# Patient Record
Sex: MALE | NOT HISPANIC OR LATINO | Employment: OTHER | URBAN - METROPOLITAN AREA
[De-identification: names, ages, dates, MRNs, and addresses within clinical notes are randomized per-mention and may not be internally consistent; named-entity substitution may affect disease eponyms.]

---

## 2023-02-17 PROBLEM — M17.12 PRIMARY OSTEOARTHRITIS OF LEFT KNEE: Status: ACTIVE | Noted: 2023-02-17

## 2023-02-17 PROBLEM — H93.11 SUBJECTIVE TINNITUS, RIGHT: Status: ACTIVE | Noted: 2023-02-17

## 2023-02-17 PROBLEM — M54.16 LUMBAR RADICULOPATHY: Status: ACTIVE | Noted: 2023-02-17

## 2023-02-17 PROBLEM — G57.62 MORTON'S NEUROMA OF LEFT FOOT: Status: ACTIVE | Noted: 2023-02-17

## 2023-02-17 PROBLEM — M65.4 DE QUERVAIN'S TENOSYNOVITIS: Status: ACTIVE | Noted: 2023-02-17

## 2023-02-17 PROBLEM — M17.9 OSTEOARTHRITIS OF KNEE: Status: ACTIVE | Noted: 2023-02-17

## 2023-02-17 PROBLEM — E55.9 VITAMIN D DEFICIENCY: Status: ACTIVE | Noted: 2023-02-17

## 2023-02-17 PROBLEM — J30.9 ALLERGIC RHINITIS: Status: ACTIVE | Noted: 2023-02-17

## 2023-02-17 PROBLEM — R42 DIZZINESS: Status: ACTIVE | Noted: 2023-02-17

## 2023-02-17 PROBLEM — N43.3 HYDROCELE, UNSPECIFIED: Status: ACTIVE | Noted: 2023-02-17

## 2023-02-17 PROBLEM — G89.29 CHRONIC PAIN OF LEFT KNEE: Status: ACTIVE | Noted: 2023-02-17

## 2023-02-17 PROBLEM — K59.09 CHRONIC CONSTIPATION: Status: ACTIVE | Noted: 2023-02-17

## 2023-02-17 PROBLEM — I49.1 PAC (PREMATURE ATRIAL CONTRACTION): Status: ACTIVE | Noted: 2023-02-17

## 2023-02-17 PROBLEM — B34.9 VIRAL SYNDROME: Status: ACTIVE | Noted: 2023-02-17

## 2023-02-17 PROBLEM — G58.9 ENTRAPMENT NEUROPATHY: Status: ACTIVE | Noted: 2023-02-17

## 2023-02-17 PROBLEM — H90.3 BILATERAL SENSORINEURAL HEARING LOSS: Status: ACTIVE | Noted: 2023-02-17

## 2023-02-17 PROBLEM — M18.12 LOCALIZED PRIMARY OSTEOARTHRITIS OF CARPOMETACARPAL JOINT OF LEFT THUMB: Status: ACTIVE | Noted: 2023-02-17

## 2023-02-17 PROBLEM — H81.09 MENIERE'S DISEASE: Status: ACTIVE | Noted: 2023-02-17

## 2023-02-17 PROBLEM — M54.59 MECHANICAL LOW BACK PAIN: Status: ACTIVE | Noted: 2023-02-17

## 2023-02-17 PROBLEM — R00.2 PALPITATIONS: Status: ACTIVE | Noted: 2023-02-17

## 2023-02-17 PROBLEM — S69.90XA FINGER INJURY: Status: ACTIVE | Noted: 2023-02-17

## 2023-02-17 PROBLEM — I47.10 PAROXYSMAL SVT (SUPRAVENTRICULAR TACHYCARDIA) (CMS-HCC): Status: ACTIVE | Noted: 2023-02-17

## 2023-02-17 PROBLEM — M77.10 LATERAL EPICONDYLITIS: Status: ACTIVE | Noted: 2023-02-17

## 2023-02-17 PROBLEM — T14.8XXA WOUND DISCHARGE: Status: ACTIVE | Noted: 2023-02-17

## 2023-02-17 PROBLEM — H93.8X1 FULLNESS IN EAR, RIGHT: Status: ACTIVE | Noted: 2023-02-17

## 2023-02-17 PROBLEM — I47.29 NSVT (NONSUSTAINED VENTRICULAR TACHYCARDIA) (MULTI): Status: ACTIVE | Noted: 2023-02-17

## 2023-02-17 PROBLEM — D36.10 SCHWANNOMA: Status: ACTIVE | Noted: 2023-02-17

## 2023-02-17 PROBLEM — M79.643 HAND PAIN: Status: ACTIVE | Noted: 2023-02-17

## 2023-02-17 PROBLEM — I49.3 PVC (PREMATURE VENTRICULAR CONTRACTION): Status: ACTIVE | Noted: 2023-02-17

## 2023-02-17 PROBLEM — M20.21 HALLUX RIGIDUS, RIGHT FOOT: Status: ACTIVE | Noted: 2023-02-17

## 2023-02-17 PROBLEM — H90.3 ASYMMETRIC SNHL (SENSORINEURAL HEARING LOSS): Status: ACTIVE | Noted: 2023-02-17

## 2023-02-17 PROBLEM — R19.8 CHANGE IN BOWEL MOVEMENT: Status: ACTIVE | Noted: 2023-02-17

## 2023-02-17 PROBLEM — R42 VERTIGO: Status: ACTIVE | Noted: 2023-02-17

## 2023-02-17 PROBLEM — N45.2 ORCHITIS: Status: ACTIVE | Noted: 2023-02-17

## 2023-02-17 PROBLEM — M25.562 CHRONIC PAIN OF LEFT KNEE: Status: ACTIVE | Noted: 2023-02-17

## 2023-02-17 PROBLEM — M19.071 OSTEOARTHRITIS OF FIRST METATARSOPHALANGEAL (MTP) JOINT OF RIGHT FOOT: Status: ACTIVE | Noted: 2023-02-17

## 2023-02-17 RX ORDER — CHOLECALCIFEROL (VITAMIN D3) 50 MCG
1 TABLET ORAL DAILY
COMMUNITY

## 2023-02-17 RX ORDER — TRIAMTERENE AND HYDROCHLOROTHIAZIDE 37.5; 25 MG/1; MG/1
0.5 CAPSULE ORAL DAILY
COMMUNITY
End: 2024-03-28 | Stop reason: SDUPTHER

## 2023-02-17 RX ORDER — PSYLLIUM SEED
1 PACKET (EA) ORAL DAILY
COMMUNITY

## 2023-02-17 RX ORDER — METOPROLOL SUCCINATE 25 MG/1
25 TABLET, EXTENDED RELEASE ORAL DAILY
COMMUNITY
End: 2023-03-31 | Stop reason: ALTCHOICE

## 2023-03-31 ENCOUNTER — OFFICE VISIT (OUTPATIENT)
Dept: PRIMARY CARE | Facility: CLINIC | Age: 68
End: 2023-03-31
Payer: MEDICARE

## 2023-03-31 ENCOUNTER — LAB (OUTPATIENT)
Dept: LAB | Facility: LAB | Age: 68
End: 2023-03-31
Payer: MEDICARE

## 2023-03-31 VITALS
SYSTOLIC BLOOD PRESSURE: 112 MMHG | OXYGEN SATURATION: 98 % | RESPIRATION RATE: 16 BRPM | BODY MASS INDEX: 30.39 KG/M2 | HEIGHT: 65 IN | DIASTOLIC BLOOD PRESSURE: 72 MMHG | WEIGHT: 182.4 LBS | HEART RATE: 68 BPM | TEMPERATURE: 97.2 F

## 2023-03-31 DIAGNOSIS — Z12.5 SCREENING FOR PROSTATE CANCER: ICD-10-CM

## 2023-03-31 DIAGNOSIS — Z00.00 ROUTINE GENERAL MEDICAL EXAMINATION AT HEALTH CARE FACILITY: Primary | ICD-10-CM

## 2023-03-31 DIAGNOSIS — E55.9 VITAMIN D DEFICIENCY: ICD-10-CM

## 2023-03-31 DIAGNOSIS — R53.83 OTHER FATIGUE: ICD-10-CM

## 2023-03-31 DIAGNOSIS — E78.5 HYPERLIPIDEMIA, UNSPECIFIED HYPERLIPIDEMIA TYPE: ICD-10-CM

## 2023-03-31 DIAGNOSIS — I47.29 NSVT (NONSUSTAINED VENTRICULAR TACHYCARDIA) (MULTI): ICD-10-CM

## 2023-03-31 DIAGNOSIS — Z23 NEED FOR VACCINATION: ICD-10-CM

## 2023-03-31 PROBLEM — R42 VERTIGO: Status: RESOLVED | Noted: 2023-02-17 | Resolved: 2023-03-31

## 2023-03-31 PROBLEM — B34.9 VIRAL SYNDROME: Status: RESOLVED | Noted: 2023-02-17 | Resolved: 2023-03-31

## 2023-03-31 PROBLEM — M54.59 MECHANICAL LOW BACK PAIN: Status: RESOLVED | Noted: 2023-02-17 | Resolved: 2023-03-31

## 2023-03-31 PROBLEM — H93.11 SUBJECTIVE TINNITUS, RIGHT: Status: RESOLVED | Noted: 2023-02-17 | Resolved: 2023-03-31

## 2023-03-31 PROBLEM — G89.29 CHRONIC PAIN OF LEFT KNEE: Status: RESOLVED | Noted: 2023-02-17 | Resolved: 2023-03-31

## 2023-03-31 PROBLEM — I49.3 PVC (PREMATURE VENTRICULAR CONTRACTION): Status: RESOLVED | Noted: 2023-02-17 | Resolved: 2023-03-31

## 2023-03-31 PROBLEM — I49.1 PAC (PREMATURE ATRIAL CONTRACTION): Status: RESOLVED | Noted: 2023-02-17 | Resolved: 2023-03-31

## 2023-03-31 PROBLEM — M17.9 OSTEOARTHRITIS OF KNEE: Status: RESOLVED | Noted: 2023-02-17 | Resolved: 2023-03-31

## 2023-03-31 PROBLEM — R42 DIZZINESS: Status: RESOLVED | Noted: 2023-02-17 | Resolved: 2023-03-31

## 2023-03-31 PROBLEM — M79.643 HAND PAIN: Status: RESOLVED | Noted: 2023-02-17 | Resolved: 2023-03-31

## 2023-03-31 PROBLEM — N45.2 ORCHITIS: Status: RESOLVED | Noted: 2023-02-17 | Resolved: 2023-03-31

## 2023-03-31 PROBLEM — M77.10 LATERAL EPICONDYLITIS: Status: RESOLVED | Noted: 2023-02-17 | Resolved: 2023-03-31

## 2023-03-31 PROBLEM — M25.562 CHRONIC PAIN OF LEFT KNEE: Status: RESOLVED | Noted: 2023-02-17 | Resolved: 2023-03-31

## 2023-03-31 PROBLEM — R19.8 CHANGE IN BOWEL MOVEMENT: Status: RESOLVED | Noted: 2023-02-17 | Resolved: 2023-03-31

## 2023-03-31 PROBLEM — H90.3 ASYMMETRIC SNHL (SENSORINEURAL HEARING LOSS): Status: RESOLVED | Noted: 2023-02-17 | Resolved: 2023-03-31

## 2023-03-31 PROBLEM — R00.2 PALPITATIONS: Status: RESOLVED | Noted: 2023-02-17 | Resolved: 2023-03-31

## 2023-03-31 PROBLEM — H93.8X1 FULLNESS IN EAR, RIGHT: Status: RESOLVED | Noted: 2023-02-17 | Resolved: 2023-03-31

## 2023-03-31 PROBLEM — I47.10 PAROXYSMAL SVT (SUPRAVENTRICULAR TACHYCARDIA) (CMS-HCC): Status: RESOLVED | Noted: 2023-02-17 | Resolved: 2023-03-31

## 2023-03-31 PROBLEM — N43.3 HYDROCELE, UNSPECIFIED: Status: RESOLVED | Noted: 2023-02-17 | Resolved: 2023-03-31

## 2023-03-31 PROBLEM — S69.90XA FINGER INJURY: Status: RESOLVED | Noted: 2023-02-17 | Resolved: 2023-03-31

## 2023-03-31 PROBLEM — T14.8XXA WOUND DISCHARGE: Status: RESOLVED | Noted: 2023-02-17 | Resolved: 2023-03-31

## 2023-03-31 LAB
ALANINE AMINOTRANSFERASE (SGPT) (U/L) IN SER/PLAS: 14 U/L (ref 10–52)
ALBUMIN (G/DL) IN SER/PLAS: 4.6 G/DL (ref 3.4–5)
ALKALINE PHOSPHATASE (U/L) IN SER/PLAS: 52 U/L (ref 33–136)
ANION GAP IN SER/PLAS: 12 MMOL/L (ref 10–20)
ASPARTATE AMINOTRANSFERASE (SGOT) (U/L) IN SER/PLAS: 18 U/L (ref 9–39)
BILIRUBIN TOTAL (MG/DL) IN SER/PLAS: 1 MG/DL (ref 0–1.2)
CALCIDIOL (25 OH VITAMIN D3) (NG/ML) IN SER/PLAS: 32 NG/ML
CALCIUM (MG/DL) IN SER/PLAS: 9.5 MG/DL (ref 8.6–10.3)
CARBON DIOXIDE, TOTAL (MMOL/L) IN SER/PLAS: 27 MMOL/L (ref 21–32)
CHLORIDE (MMOL/L) IN SER/PLAS: 102 MMOL/L (ref 98–107)
CHOLESTEROL (MG/DL) IN SER/PLAS: 178 MG/DL (ref 0–199)
CHOLESTEROL IN HDL (MG/DL) IN SER/PLAS: 69.2 MG/DL
CHOLESTEROL/HDL RATIO: 2.6
CREATININE (MG/DL) IN SER/PLAS: 0.87 MG/DL (ref 0.5–1.3)
ERYTHROCYTE DISTRIBUTION WIDTH (RATIO) BY AUTOMATED COUNT: 11.4 % (ref 11.5–14.5)
ERYTHROCYTE MEAN CORPUSCULAR HEMOGLOBIN CONCENTRATION (G/DL) BY AUTOMATED: 34 G/DL (ref 32–36)
ERYTHROCYTE MEAN CORPUSCULAR VOLUME (FL) BY AUTOMATED COUNT: 97 FL (ref 80–100)
ERYTHROCYTES (10*6/UL) IN BLOOD BY AUTOMATED COUNT: 4.78 X10E12/L (ref 4.5–5.9)
GFR MALE: >90 ML/MIN/1.73M2
GLUCOSE (MG/DL) IN SER/PLAS: 87 MG/DL (ref 74–99)
HEMATOCRIT (%) IN BLOOD BY AUTOMATED COUNT: 46.2 % (ref 41–52)
HEMOGLOBIN (G/DL) IN BLOOD: 15.7 G/DL (ref 13.5–17.5)
LDL: 92 MG/DL (ref 0–99)
LEUKOCYTES (10*3/UL) IN BLOOD BY AUTOMATED COUNT: 4.9 X10E9/L (ref 4.4–11.3)
PLATELETS (10*3/UL) IN BLOOD AUTOMATED COUNT: 273 X10E9/L (ref 150–450)
POTASSIUM (MMOL/L) IN SER/PLAS: 4 MMOL/L (ref 3.5–5.3)
PROSTATE SPECIFIC ANTIGEN,SCREEN: 2.25 NG/ML (ref 0–4)
PROTEIN TOTAL: 7.2 G/DL (ref 6.4–8.2)
SODIUM (MMOL/L) IN SER/PLAS: 137 MMOL/L (ref 136–145)
THYROTROPIN (MIU/L) IN SER/PLAS BY DETECTION LIMIT <= 0.05 MIU/L: 2.06 MIU/L (ref 0.44–3.98)
TRIGLYCERIDE (MG/DL) IN SER/PLAS: 83 MG/DL (ref 0–149)
UREA NITROGEN (MG/DL) IN SER/PLAS: 14 MG/DL (ref 6–23)
VLDL: 17 MG/DL (ref 0–40)

## 2023-03-31 PROCEDURE — 80061 LIPID PANEL: CPT

## 2023-03-31 PROCEDURE — 80053 COMPREHEN METABOLIC PANEL: CPT

## 2023-03-31 PROCEDURE — 82306 VITAMIN D 25 HYDROXY: CPT

## 2023-03-31 PROCEDURE — 85027 COMPLETE CBC AUTOMATED: CPT

## 2023-03-31 PROCEDURE — 1159F MED LIST DOCD IN RCRD: CPT | Performed by: INTERNAL MEDICINE

## 2023-03-31 PROCEDURE — 84153 ASSAY OF PSA TOTAL: CPT

## 2023-03-31 PROCEDURE — G0009 ADMIN PNEUMOCOCCAL VACCINE: HCPCS | Performed by: INTERNAL MEDICINE

## 2023-03-31 PROCEDURE — 84443 ASSAY THYROID STIM HORMONE: CPT

## 2023-03-31 PROCEDURE — 1036F TOBACCO NON-USER: CPT | Performed by: INTERNAL MEDICINE

## 2023-03-31 PROCEDURE — 1170F FXNL STATUS ASSESSED: CPT | Performed by: INTERNAL MEDICINE

## 2023-03-31 PROCEDURE — 1160F RVW MEDS BY RX/DR IN RCRD: CPT | Performed by: INTERNAL MEDICINE

## 2023-03-31 PROCEDURE — G0439 PPPS, SUBSEQ VISIT: HCPCS | Performed by: INTERNAL MEDICINE

## 2023-03-31 PROCEDURE — 36415 COLL VENOUS BLD VENIPUNCTURE: CPT

## 2023-03-31 PROCEDURE — 90677 PCV20 VACCINE IM: CPT | Performed by: INTERNAL MEDICINE

## 2023-03-31 ASSESSMENT — ENCOUNTER SYMPTOMS
MYALGIAS: 0
ARTHRALGIAS: 0
EYE PAIN: 0
PALPITATIONS: 1
FREQUENCY: 0
WHEEZING: 0
HEADACHES: 0
ABDOMINAL PAIN: 0
UNEXPECTED WEIGHT CHANGE: 0
CHILLS: 0
SHORTNESS OF BREATH: 0
FEVER: 0
POLYDIPSIA: 0
COUGH: 0
VOMITING: 0
DYSURIA: 0
RHINORRHEA: 0
POLYPHAGIA: 0
WOUND: 0
CONSTIPATION: 0
NERVOUS/ANXIOUS: 0
BLOOD IN STOOL: 0
DIZZINESS: 0
NAUSEA: 0
SORE THROAT: 0
HEMATURIA: 0
DIARRHEA: 0
CHEST TIGHTNESS: 0
DYSPHORIC MOOD: 0

## 2023-03-31 ASSESSMENT — ACTIVITIES OF DAILY LIVING (ADL)
DRESSING: INDEPENDENT
TAKING_MEDICATION: INDEPENDENT
MANAGING_FINANCES: INDEPENDENT
GROCERY_SHOPPING: INDEPENDENT
DOING_HOUSEWORK: INDEPENDENT
BATHING: INDEPENDENT

## 2023-03-31 ASSESSMENT — LIFESTYLE VARIABLES
HOW OFTEN DO YOU HAVE A DRINK CONTAINING ALCOHOL: 4 OR MORE TIMES A WEEK
HOW MANY STANDARD DRINKS CONTAINING ALCOHOL DO YOU HAVE ON A TYPICAL DAY: 1 OR 2

## 2023-03-31 ASSESSMENT — PATIENT HEALTH QUESTIONNAIRE - PHQ9
2. FEELING DOWN, DEPRESSED OR HOPELESS: NOT AT ALL
1. LITTLE INTEREST OR PLEASURE IN DOING THINGS: NOT AT ALL
SUM OF ALL RESPONSES TO PHQ9 QUESTIONS 1 AND 2: 0

## 2023-03-31 NOTE — PROGRESS NOTES
"Subjective   Reason for Visit: Anthony Clark is an 67 y.o. male here for a Medicare Wellness visit.         HPI    Wants to lose weight  Elliptical 3-5 days a week    Had COVID 2x and in May and June. Bad cold and went away    Mid August had palpitations and went to ER. Had echo and calcium score. Saw Dr. Munoz 9/27 and told could do metoprolol. Had holter-PVCs and PACs, SVT. He never did the metoprolol. Palpitations went away.    Overall feels well  Saw [facility]    Patient Care Team:  Brittni Fernandez MD as PCP - General     Review of Systems   Constitutional:  Negative for chills, fever and unexpected weight change.   HENT:  Positive for hearing loss. Negative for congestion, rhinorrhea and sore throat.    Eyes:  Negative for pain and visual disturbance.   Respiratory:  Negative for cough, chest tightness, shortness of breath and wheezing.    Cardiovascular:  Positive for palpitations. Negative for chest pain.   Gastrointestinal:  Negative for abdominal pain, blood in stool, constipation, diarrhea, nausea and vomiting.   Endocrine: Negative for cold intolerance, heat intolerance, polydipsia and polyphagia.   Genitourinary:  Negative for dysuria, frequency and hematuria.   Musculoskeletal:  Negative for arthralgias and myalgias.   Skin:  Negative for rash and wound.   Neurological:  Negative for dizziness, syncope and headaches.   Psychiatric/Behavioral:  Negative for dysphoric mood. The patient is not nervous/anxious.        Objective   Vitals:  /72   Pulse 68   Temp 36.2 °C (97.2 °F)   Resp 16   Ht 1.651 m (5' 5\")   Wt 82.7 kg (182 lb 6.4 oz)   SpO2 98%   BMI 30.35 kg/m²       Physical Exam  Vitals reviewed.   Constitutional:       Appearance: Normal appearance. He is not ill-appearing.   HENT:      Head: Normocephalic and atraumatic.      Right Ear: Tympanic membrane normal.      Left Ear: Tympanic membrane normal.      Nose: Nose normal.      Mouth/Throat:      Mouth: Mucous membranes are dry.      " Pharynx: Oropharynx is clear.   Eyes:      Extraocular Movements: Extraocular movements intact.      Conjunctiva/sclera: Conjunctivae normal.      Pupils: Pupils are equal, round, and reactive to light.   Cardiovascular:      Rate and Rhythm: Normal rate and regular rhythm.   Pulmonary:      Effort: Pulmonary effort is normal.      Breath sounds: Normal breath sounds. No wheezing.   Abdominal:      General: There is no distension.      Palpations: Abdomen is soft. There is no mass.      Tenderness: There is no abdominal tenderness.   Musculoskeletal:         General: No swelling.      Cervical back: Neck supple.   Lymphadenopathy:      Cervical: No cervical adenopathy.   Neurological:      General: No focal deficit present.      Mental Status: He is alert and oriented to person, place, and time.      Gait: Gait normal.   Psychiatric:         Mood and Affect: Mood normal.         Behavior: Behavior normal.         Thought Content: Thought content normal.         Assessment/Plan   Problem List Items Addressed This Visit    None    CPE completed.  Advised to keep a heart healthy, low fat diet with fruits and veggies like Mediterranean diet.  Advised on the importance of exercise and maintaining 150 minutes of exercise per week (30 minutes per day 5 days a week).  Advised on regular eye and dental visits.  Discussed age appropriate cancer screening, immunizations and recommendations given.  Discussed avoiding illicit drugs and tobacco. Advised on appropriate use of alcohol.  Advised to wear seat belt.  He will drop off living will     Chronic bowel issues: metamucil has helped     Meniere's disease: 2010; follows with Dr. Vo, has had steroids. On HCTZ/triamterene.   -send to vestibular rehab  -followup with ENT     Vitamin D deficiency: recheck in Feb, 2000 IU daily     Hypertriglyceridemia: WNL, monitor annually     Cervical OA: used to have severe pain and terrible numbness. Had EMG and MRIs. Now much better. NO  weakness. Had cortisone shot and      Left knee OA; seeing Dr. Uriarte     Hx of SVT: saw cards  -neg EKG in July.  -helps if cuts out caffeine     CPE 1 year. Labs     Health Maintenance  -Prostate Cancer Screening: PSA WNL 2/21  -Vaccinations: advised on shingrix. UTD tdap, Prevnar-20 today. UTD covid  -Lung Cancer Screening: former smoker, does not qualify  -AAA Screening: never did  -Colonoscopy: 9/17--10 years

## 2023-10-10 ENCOUNTER — OFFICE VISIT (OUTPATIENT)
Dept: ORTHOPEDIC SURGERY | Facility: CLINIC | Age: 68
End: 2023-10-10
Payer: MEDICARE

## 2023-10-10 VITALS — HEIGHT: 65 IN | BODY MASS INDEX: 29.66 KG/M2 | WEIGHT: 178 LBS

## 2023-10-10 DIAGNOSIS — M17.10 ARTHRITIS OF KNEE: Primary | ICD-10-CM

## 2023-10-10 PROCEDURE — 2500000004 HC RX 250 GENERAL PHARMACY W/ HCPCS (ALT 636 FOR OP/ED): Performed by: ORTHOPAEDIC SURGERY

## 2023-10-10 PROCEDURE — 2500000005 HC RX 250 GENERAL PHARMACY W/O HCPCS: Performed by: ORTHOPAEDIC SURGERY

## 2023-10-10 PROCEDURE — 1160F RVW MEDS BY RX/DR IN RCRD: CPT | Performed by: ORTHOPAEDIC SURGERY

## 2023-10-10 PROCEDURE — 1126F AMNT PAIN NOTED NONE PRSNT: CPT | Performed by: ORTHOPAEDIC SURGERY

## 2023-10-10 PROCEDURE — 1036F TOBACCO NON-USER: CPT | Performed by: ORTHOPAEDIC SURGERY

## 2023-10-10 PROCEDURE — 20610 DRAIN/INJ JOINT/BURSA W/O US: CPT | Mod: LT | Performed by: ORTHOPAEDIC SURGERY

## 2023-10-10 PROCEDURE — 1159F MED LIST DOCD IN RCRD: CPT | Performed by: ORTHOPAEDIC SURGERY

## 2023-10-10 RX ORDER — LIDOCAINE HYDROCHLORIDE 10 MG/ML
2 INJECTION INFILTRATION; PERINEURAL
Status: COMPLETED | OUTPATIENT
Start: 2023-10-10 | End: 2023-10-10

## 2023-10-10 RX ORDER — TRIAMCINOLONE ACETONIDE 40 MG/ML
40 INJECTION, SUSPENSION INTRA-ARTICULAR; INTRAMUSCULAR
Status: COMPLETED | OUTPATIENT
Start: 2023-10-10 | End: 2023-10-10

## 2023-10-10 RX ADMIN — TRIAMCINOLONE ACETONIDE 40 MG: 40 INJECTION, SUSPENSION INTRA-ARTICULAR; INTRAMUSCULAR at 10:24

## 2023-10-10 RX ADMIN — LIDOCAINE HYDROCHLORIDE 2 ML: 10 INJECTION, SOLUTION INFILTRATION; PERINEURAL at 10:24

## 2023-10-10 NOTE — PROGRESS NOTES
History of Present Illness:  Patient returns today for an injection with corticosteroid. The patient endorsing knee pain refractory to activity modifications and oral medications.    Physical Examination:  Trace effusion  Tenderness over medial and lateral joint line    Assessment:  Patient with known osteoarthritis of the knee    Plan:  Corticosteroid injection provided, patient tolerated it well. See procedure note.    Injection performed as detailed in the procedure note below.     L Inj/Asp: L knee on 10/10/2023 10:24 AM  Indications: pain  Details: 22 G needle, anteromedial approach  Medications: 2 mL lidocaine 10 mg/mL (1 %); 40 mg triamcinolone acetonide 40 mg/mL  Outcome: tolerated well, no immediate complications  Procedure, treatment alternatives, risks and benefits explained, specific risks discussed. Consent was given by the patient. Immediately prior to procedure a time out was called to verify the correct patient, procedure, equipment, support staff and site/side marked as required. Patient was prepped and draped in the usual sterile fashion.

## 2023-10-26 ENCOUNTER — OFFICE VISIT (OUTPATIENT)
Dept: SURGERY | Facility: CLINIC | Age: 68
End: 2023-10-26
Payer: MEDICARE

## 2023-10-26 VITALS
DIASTOLIC BLOOD PRESSURE: 76 MMHG | SYSTOLIC BLOOD PRESSURE: 118 MMHG | WEIGHT: 181 LBS | TEMPERATURE: 98.3 F | HEIGHT: 65 IN | HEART RATE: 97 BPM | BODY MASS INDEX: 30.16 KG/M2

## 2023-10-26 DIAGNOSIS — K64.9 HEMORRHOIDS, UNSPECIFIED HEMORRHOID TYPE: ICD-10-CM

## 2023-10-26 DIAGNOSIS — K64.8 PROLAPSED INTERNAL HEMORRHOIDS: Primary | ICD-10-CM

## 2023-10-26 DIAGNOSIS — L29.0 PRURITUS ANI: ICD-10-CM

## 2023-10-26 PROCEDURE — 1159F MED LIST DOCD IN RCRD: CPT | Performed by: NURSE PRACTITIONER

## 2023-10-26 PROCEDURE — 99213 OFFICE O/P EST LOW 20 MIN: CPT | Performed by: NURSE PRACTITIONER

## 2023-10-26 PROCEDURE — 46600 DIAGNOSTIC ANOSCOPY SPX: CPT | Performed by: NURSE PRACTITIONER

## 2023-10-26 PROCEDURE — 1126F AMNT PAIN NOTED NONE PRSNT: CPT | Performed by: NURSE PRACTITIONER

## 2023-10-26 PROCEDURE — 99203 OFFICE O/P NEW LOW 30 MIN: CPT | Performed by: NURSE PRACTITIONER

## 2023-10-26 PROCEDURE — 1036F TOBACCO NON-USER: CPT | Performed by: NURSE PRACTITIONER

## 2023-10-26 PROCEDURE — 1160F RVW MEDS BY RX/DR IN RCRD: CPT | Performed by: NURSE PRACTITIONER

## 2023-10-26 RX ORDER — HYDROCORTISONE ACETATE 25 MG/1
25 SUPPOSITORY RECTAL 2 TIMES DAILY
Qty: 28 SUPPOSITORY | Refills: 0 | Status: SHIPPED | OUTPATIENT
Start: 2023-10-26 | End: 2023-11-09

## 2023-10-26 NOTE — PROGRESS NOTES
Subjective   Patient ID: Anthony Clark is a 67 y.o. male who presents today with hemorrhoidal issues.    HPI  He will have rectal bleeding and pain off and on.  He has difficulty in keeping clean at times d/t the hemorrhoids.  The hemorrhoids have become larger over the years, but no recent pain.  He will have 3-4 soft BM's back to back every day.  He takes Metamucil daily and that keeps his stools soft.  No c/o any accidents or leakage of stool.  NO hx of any perianal surgeries.  NO c/o any external hemorrhoids, just prolapsing internal hemorrhoids that he will push them back in at times.  No hx  of any perianal surgeries.    Colonoscopy  2017    Review of Systems   All other systems reviewed and are negative.      Objective   Physical Exam  Constitutional:       Appearance: Normal appearance.   HENT:      Head: Normocephalic.   Cardiovascular:      Rate and Rhythm: Normal rate and regular rhythm.   Pulmonary:      Effort: Pulmonary effort is normal.      Breath sounds: Normal breath sounds.   Abdominal:      General: Abdomen is flat. Bowel sounds are normal.      Palpations: Abdomen is soft.   Musculoskeletal:         General: Normal range of motion.      Cervical back: Normal range of motion and neck supple.   Skin:     General: Skin is warm and dry.   Neurological:      General: No focal deficit present.      Mental Status: He is alert and oriented to person, place, and time.   Psychiatric:         Mood and Affect: Mood normal.         Behavior: Behavior normal.     Anoscopy    Date/Time: 10/26/2023 3:50 PM    Performed by: KAYY Kennedy  Authorized by: KAYY Kennedy    Consent:     Consent obtained:  Verbal    Consent given by:  Patient    Risks, benefits, and alternatives were discussed: yes    Post-procedure details:     Procedure completion:  Tolerated  Comments:      No external hemorrhoids.  Mild pruritus ani.  On anoscopy, he has moderate prolapsing internal hemorrhoids.  No active  bleeding or pain.      Assessment/Plan   Anthony has moderate slightly prolapsing internal hemorrhoids and pruritus ani.  He will start using Hydrocortisone suppositories BID for 2 weeks.  We talked about rubber band ligation to the hemorrhoids in the future if needed.  For the pruritus, he will use Desitin ointment throughout the day.  He will call with any issues and will follow up in 4-6 weeks if not better.

## 2023-11-09 ENCOUNTER — OFFICE VISIT (OUTPATIENT)
Dept: UROLOGY | Facility: CLINIC | Age: 68
End: 2023-11-09
Payer: MEDICARE

## 2023-11-09 VITALS — WEIGHT: 181 LBS | TEMPERATURE: 97.3 F | BODY MASS INDEX: 30.16 KG/M2 | HEIGHT: 65 IN

## 2023-11-09 DIAGNOSIS — N50.82 SCROTAL PAIN: ICD-10-CM

## 2023-11-09 DIAGNOSIS — N40.0 BENIGN PROSTATIC HYPERPLASIA, UNSPECIFIED WHETHER LOWER URINARY TRACT SYMPTOMS PRESENT: Primary | ICD-10-CM

## 2023-11-09 DIAGNOSIS — N43.3 HYDROCELE, UNSPECIFIED HYDROCELE TYPE: ICD-10-CM

## 2023-11-09 PROCEDURE — 51798 US URINE CAPACITY MEASURE: CPT | Performed by: NURSE PRACTITIONER

## 2023-11-09 PROCEDURE — 1160F RVW MEDS BY RX/DR IN RCRD: CPT | Performed by: NURSE PRACTITIONER

## 2023-11-09 PROCEDURE — 99214 OFFICE O/P EST MOD 30 MIN: CPT | Performed by: NURSE PRACTITIONER

## 2023-11-09 PROCEDURE — 1036F TOBACCO NON-USER: CPT | Performed by: NURSE PRACTITIONER

## 2023-11-09 PROCEDURE — 1159F MED LIST DOCD IN RCRD: CPT | Performed by: NURSE PRACTITIONER

## 2023-11-09 PROCEDURE — 1126F AMNT PAIN NOTED NONE PRSNT: CPT | Performed by: NURSE PRACTITIONER

## 2023-11-09 ASSESSMENT — PATIENT HEALTH QUESTIONNAIRE - PHQ9
1. LITTLE INTEREST OR PLEASURE IN DOING THINGS: NOT AT ALL
SUM OF ALL RESPONSES TO PHQ9 QUESTIONS 1 AND 2: 0
2. FEELING DOWN, DEPRESSED OR HOPELESS: NOT AT ALL

## 2023-11-09 ASSESSMENT — COLUMBIA-SUICIDE SEVERITY RATING SCALE - C-SSRS
2. HAVE YOU ACTUALLY HAD ANY THOUGHTS OF KILLING YOURSELF?: NO
1. IN THE PAST MONTH, HAVE YOU WISHED YOU WERE DEAD OR WISHED YOU COULD GO TO SLEEP AND NOT WAKE UP?: NO
6. HAVE YOU EVER DONE ANYTHING, STARTED TO DO ANYTHING, OR PREPARED TO DO ANYTHING TO END YOUR LIFE?: NO

## 2023-11-09 ASSESSMENT — ENCOUNTER SYMPTOMS
DEPRESSION: 0
LOSS OF SENSATION IN FEET: 0
OCCASIONAL FEELINGS OF UNSTEADINESS: 0

## 2023-11-09 ASSESSMENT — PAIN SCALES - GENERAL: PAINLEVEL: 0-NO PAIN

## 2023-11-09 NOTE — PROGRESS NOTES
[facility]  Outpatient Clinic Note      Patient: Anthony Clark  Age/Sex: 68 y.o., male  MRN: [id number]      History of Present Illness  68-year-old gentleman presents for follow-up. He has a history of right hydrocele and epididymitis. Reports epididymitis symptoms a few weeks ago, which have since resolved. He does note some urinary frequency with weak urinary stream in the mornings. This improves throughout the day. No dysuria or gross hematuria, flank pain, fevers or chills.     Past Medical & Surgical History  Past Medical History:   Diagnosis Date    Elevated prostate specific antigen (PSA) 02/23/2017    Increasing prostate specific antigen level    Meniere's disease, unspecified ear 05/01/2017    Mï¿½niï¿½re's disease    Osteoarthritis of knee, unspecified 01/06/2022    Osteoarthritis of knee    Personal history of other diseases of male genital organs 03/05/2018    History of orchitis    Personal history of other diseases of the circulatory system     History of pericarditis    Personal history of other diseases of the circulatory system     Personal history of cardiac arrhythmia    Personal history of other diseases of the musculoskeletal system and connective tissue     History of arthritis    Personal history of other specified conditions     History of chest pain    Radiculopathy, cervical region 02/27/2018    Cervical radiculopathy      Past Surgical History:   Procedure Laterality Date    HERNIA REPAIR  06/12/2014    Hernia Repair    OTHER SURGICAL HISTORY  02/08/2019    Hydrocele repair    TONSILLECTOMY  06/12/2014    Tonsillectomy          Labs  Prostate Spec.Ag,Screen          Component  Ref Range & Units 7 mo ago 1 yr ago 3 yr ago   Prostate Specific Antigen,Screen  0.00 - 4.00 ng/mL 2.25 2.90 CM 2.33           Medications:  Current Outpatient Medications on File Prior to Visit   Medication Sig Dispense Refill    cholecalciferol (Vitamin D-3) 50 MCG (2000 UT) tablet Take 1 tablet (2,000 Units)  by mouth once daily.      hydrocortisone (Anusol-HC) 25 mg suppository Insert 1 suppository (25 mg) into the rectum 2 times a day for 14 days. 28 suppository 0    magnesium, amino acid chelate, 133 mg tablet Take 1 tablet (133 mg) by mouth 2 times a day.      psyllium (Metamucil, sugar,) powder Take by mouth.      triamterene-hydrochlorothiazid (Dyazide) 37.5-25 mg capsule Take 0.5 capsules by mouth once daily.       No current facility-administered medications on file prior to visit.          Physical Exam                                                                                                                      General: Well developed, well nourished, alert and cooperative, appears in no acute distress  Eyes: Non-injected conjunctiva, sclera clear, no proptosis  Cardiac: Extremities are warm and well perfused. No edema, cyanosis or pallor.   Lungs: Breathing is easy, non-labored. Speaking in clear and complete sentences. Normal diaphragmatic movement.  MSK: Ambulatory with steady gait, unassisted  Neuro: alert and oriented to person, place and time  Psych: Demonstrates good judgement and reason, without hallucinations, abnormal affect or abnormal behaviors.  Skin: no obvious lesions, no rashes      Review of Systems  Review of Systems   All other systems reviewed and are negative.         Imaging  Scrotal US 9/15/2022  IMPRESSION:  Large mildly septated right scrotal fluid collection may be a large  spermatocele or perhaps a loculated hydrocele. The right epididymis  is not seen. Dilated rete testes noted on the right. Right testicle  is otherwise unremarkable.     Small left scrotal hydrocele. There is a 6 mm left epididymal head  cysts. Left testicle is unremarkable.      Assessment & Plan  68-year-old gentleman presents for follow-up. He has a history of right hydrocele and epididymitis. Reports epididymitis symptoms a few weeks ago, which have since resolved. He does note some urinary frequency with  weak urinary stream in the mornings. This improves throughout the day. No dysuria or gross hematuria, flank pain, fevers or chills.     1) Hydrocele/Epididymitis  - Will obtain scrotal US, I will call with results    2) BPH  - PVR is low  - Will send urine for UA and culture  - Today we discussed BPH. BPH is the benign growth of prostate tissue that may cause bothersome urinary symptoms. The mechanism of action as well as the risks, benefits, common side effects, and alternatives to all prescribed medications were discussed with the patient at length. The patient had the opportunity to ask questions and all questions were answered. I primarily discussed alpha blockers, 5ARIs, and PDE5i.  I explained that 5 alpha reductase inhibitors can shrink the prostate up to 30%, can artificially decrease their PSA value by 50%, but take approximately 6-9 months to reach full efficacy and have potential side effects to include decreased libido, impotence and breast tenderness. Additionally, if you continue to experience bothersome symptoms despite medication, there are minimally invasive procedures to alleviate these symptoms.  - Not interested in medical therapy at this time    Follow-up 6 months, or sooner if needed, to reassess symptoms.       Reviewed and approved by FLAVIO SANCHEZ on 11/9/23 at 8:39 AM.

## 2023-11-13 ENCOUNTER — HOSPITAL ENCOUNTER (OUTPATIENT)
Dept: RADIOLOGY | Facility: HOSPITAL | Age: 68
Discharge: HOME | End: 2023-11-13
Payer: MEDICARE

## 2023-11-13 DIAGNOSIS — N50.82 SCROTAL PAIN: ICD-10-CM

## 2023-11-13 DIAGNOSIS — N43.3 HYDROCELE, UNSPECIFIED HYDROCELE TYPE: ICD-10-CM

## 2023-11-13 PROCEDURE — 76870 US EXAM SCROTUM: CPT | Performed by: RADIOLOGY

## 2023-11-13 PROCEDURE — 93975 VASCULAR STUDY: CPT

## 2023-12-04 ENCOUNTER — OFFICE VISIT (OUTPATIENT)
Dept: UROLOGY | Facility: HOSPITAL | Age: 68
End: 2023-12-04
Payer: MEDICARE

## 2023-12-04 DIAGNOSIS — N43.40 SPERMATOCELE: Primary | ICD-10-CM

## 2023-12-04 PROCEDURE — 99214 OFFICE O/P EST MOD 30 MIN: CPT | Performed by: UROLOGY

## 2023-12-04 PROCEDURE — 1159F MED LIST DOCD IN RCRD: CPT | Performed by: UROLOGY

## 2023-12-04 PROCEDURE — 1126F AMNT PAIN NOTED NONE PRSNT: CPT | Performed by: UROLOGY

## 2023-12-04 PROCEDURE — 1036F TOBACCO NON-USER: CPT | Performed by: UROLOGY

## 2023-12-04 PROCEDURE — 1160F RVW MEDS BY RX/DR IN RCRD: CPT | Performed by: UROLOGY

## 2023-12-04 RX ORDER — CELECOXIB 50 MG/1
400 CAPSULE ORAL ONCE
Status: CANCELLED | OUTPATIENT
Start: 2023-12-04 | End: 2023-12-04

## 2023-12-04 RX ORDER — ACETAMINOPHEN 325 MG/1
975 TABLET ORAL ONCE
Status: CANCELLED | OUTPATIENT
Start: 2023-12-04 | End: 2023-12-04

## 2023-12-04 RX ORDER — GABAPENTIN 300 MG/1
600 CAPSULE ORAL ONCE
Status: CANCELLED | OUTPATIENT
Start: 2023-12-04 | End: 2023-12-04

## 2023-12-04 RX ORDER — SODIUM CHLORIDE, SODIUM LACTATE, POTASSIUM CHLORIDE, CALCIUM CHLORIDE 600; 310; 30; 20 MG/100ML; MG/100ML; MG/100ML; MG/100ML
100 INJECTION, SOLUTION INTRAVENOUS CONTINUOUS
Status: CANCELLED | OUTPATIENT
Start: 2023-12-04

## 2023-12-04 NOTE — PROGRESS NOTES
"    Today's visit:  Referred by Kareen for right hydrocele and epididymitis. Reports epididymitis symptoms a few weeks ago, which have since resolved. He does note some urinary frequency with weak urinary stream in the mornings. This improves throughout the day. No dysuria or gross hematuria, flank pain, fevers or chills.      Had right hydrocelectomy in 2017 with Dr. Davidson    Scrotal US personally reviewed:  Redemonstration of a large septated fluid collection on the right  which may represent a spermatocele or loculated hydrocele. This  currently measures up to 8.5 x 5.9 x 6.5 cm and previously measured  8.3 x 6.5 x 3.8 cm on 09/15/2022.      Moderate to large complex left hydrocele.      Labs  No results found for: \"TESTOSTERONE\"  No results found for: \"LH\"  No results found for: \"FSH\"  No components found for: \"ESTRADIAL\"  Lab Results   Component Value Date    PSA 2.20 03/12/2021     No components found for: \"CBC\"  No results found for: \"PROLACTIN\"  No results found for: \"HGBA1C\"      PMH:  Past Medical History:   Diagnosis Date    Elevated prostate specific antigen (PSA) 02/23/2017    Increasing prostate specific antigen level    Meniere's disease, unspecified ear 05/01/2017    Mï¿½niï¿½re's disease    Osteoarthritis of knee, unspecified 01/06/2022    Osteoarthritis of knee    Personal history of other diseases of male genital organs 03/05/2018    History of orchitis    Personal history of other diseases of the circulatory system     History of pericarditis    Personal history of other diseases of the circulatory system     Personal history of cardiac arrhythmia    Personal history of other diseases of the musculoskeletal system and connective tissue     History of arthritis    Personal history of other specified conditions     History of chest pain    Radiculopathy, cervical region 02/27/2018    Cervical radiculopathy        PSH:  Past Surgical History:   Procedure Laterality Date    HERNIA REPAIR  " 06/12/2014    Hernia Repair    OTHER SURGICAL HISTORY  02/08/2019    Hydrocele repair    TONSILLECTOMY  06/12/2014    Tonsillectomy        Medications:    Current Outpatient Medications:     cholecalciferol (Vitamin D-3) 50 MCG (2000 UT) tablet, Take 1 tablet (2,000 Units) by mouth once daily., Disp: , Rfl:     magnesium, amino acid chelate, 133 mg tablet, Take 1 tablet (133 mg) by mouth 2 times a day., Disp: , Rfl:     psyllium (Metamucil, sugar,) powder, Take by mouth., Disp: , Rfl:     triamterene-hydrochlorothiazid (Dyazide) 37.5-25 mg capsule, Take 0.5 capsules by mouth once daily., Disp: , Rfl:     Allergy:  No Known Allergies     Exam  CONSTITUTIONAL:        No acute distress    HEAD:        Normocephalic and atraumatic    CHEST / RESPIRATORY      no excess work of breathing, no respiratory distress,    ABDOMEN / GASTROINTESTINAL:        Abdomen nondistended      Testicles descended bilaterally, nontender, no masses  Vasa palpable bilaterally  Penis circ'd, no lesions, no plaques  RIGHT hydrocele and spermatocle present        Assessment/Plan  RIGHT spermatocele / Hyc\drocele  Discussed options including observation.  Discussed options for hydrocele/spermatocelectomy, specifically, observation, aspiration and hydrocelectomy  Discussed risks, including bleeding, infection, damage to adjacent structures (specifically testicle and epididymis / vas) , testicular atrophy, need for further procedures, recurrence, etc.   Discussed that this is an outpatient procedure that may require a temporary drain placement   All questions answered  The patient will contact us to proceed with RIGHT spermatocelectomy / hydrocelectomy

## 2023-12-08 PROBLEM — N43.40 SPERMATOCELE: Status: ACTIVE | Noted: 2023-12-04

## 2024-01-10 ENCOUNTER — OFFICE VISIT (OUTPATIENT)
Dept: ORTHOPEDIC SURGERY | Facility: CLINIC | Age: 69
End: 2024-01-10
Payer: MEDICARE

## 2024-01-10 DIAGNOSIS — M17.10 ARTHRITIS OF KNEE: Primary | ICD-10-CM

## 2024-01-10 PROCEDURE — 20610 DRAIN/INJ JOINT/BURSA W/O US: CPT | Mod: LT | Performed by: ORTHOPAEDIC SURGERY

## 2024-01-10 PROCEDURE — 2500000005 HC RX 250 GENERAL PHARMACY W/O HCPCS: Performed by: ORTHOPAEDIC SURGERY

## 2024-01-10 PROCEDURE — 99024 POSTOP FOLLOW-UP VISIT: CPT | Performed by: ORTHOPAEDIC SURGERY

## 2024-01-10 PROCEDURE — 2500000004 HC RX 250 GENERAL PHARMACY W/ HCPCS (ALT 636 FOR OP/ED): Performed by: ORTHOPAEDIC SURGERY

## 2024-01-10 PROCEDURE — 1159F MED LIST DOCD IN RCRD: CPT | Performed by: ORTHOPAEDIC SURGERY

## 2024-01-10 PROCEDURE — 1126F AMNT PAIN NOTED NONE PRSNT: CPT | Performed by: ORTHOPAEDIC SURGERY

## 2024-01-10 PROCEDURE — 1036F TOBACCO NON-USER: CPT | Performed by: ORTHOPAEDIC SURGERY

## 2024-01-10 RX ORDER — LIDOCAINE HYDROCHLORIDE 10 MG/ML
2 INJECTION INFILTRATION; PERINEURAL
Status: COMPLETED | OUTPATIENT
Start: 2024-01-10 | End: 2024-01-10

## 2024-01-10 RX ORDER — TRIAMCINOLONE ACETONIDE 40 MG/ML
40 INJECTION, SUSPENSION INTRA-ARTICULAR; INTRAMUSCULAR
Status: COMPLETED | OUTPATIENT
Start: 2024-01-10 | End: 2024-01-10

## 2024-01-10 RX ADMIN — TRIAMCINOLONE ACETONIDE 40 MG: 40 INJECTION, SUSPENSION INTRA-ARTICULAR; INTRAMUSCULAR at 13:36

## 2024-01-10 RX ADMIN — LIDOCAINE HYDROCHLORIDE 2 ML: 10 INJECTION, SOLUTION INFILTRATION; PERINEURAL at 13:36

## 2024-01-10 NOTE — PROGRESS NOTES
History of Present Illness:  Patient returns today for an injection with corticosteroid. The patient endorsing knee pain refractory to activity modifications and oral medications.    Physical Examination:  Trace effusion  Tenderness over medial and lateral joint line    Assessment:  Patient with known osteoarthritis of the knee    Plan:  Corticosteroid injection provided, patient tolerated it well. See procedure note.    Injection performed as detailed in the procedure note below.     L Inj/Asp: L knee on 1/10/2024 1:36 PM  Indications: pain  Details: 22 G needle, anteromedial approach  Medications: 2 mL lidocaine 10 mg/mL (1 %); 40 mg triamcinolone acetonide 40 mg/mL  Outcome: tolerated well, no immediate complications  Procedure, treatment alternatives, risks and benefits explained, specific risks discussed. Consent was given by the patient. Immediately prior to procedure a time out was called to verify the correct patient, procedure, equipment, support staff and site/side marked as required. Patient was prepped and draped in the usual sterile fashion.

## 2024-01-23 ENCOUNTER — TELEPHONE (OUTPATIENT)
Dept: PRIMARY CARE | Facility: CLINIC | Age: 69
End: 2024-01-23
Payer: MEDICARE

## 2024-01-23 NOTE — TELEPHONE ENCOUNTER
1/23/24 called left message that he will be due for MWV end of March beginning of April, instructed to call back and schedule appt.

## 2024-02-02 ENCOUNTER — APPOINTMENT (OUTPATIENT)
Dept: PREADMISSION TESTING | Facility: HOSPITAL | Age: 69
End: 2024-02-02
Payer: MEDICARE

## 2024-02-07 ENCOUNTER — APPOINTMENT (OUTPATIENT)
Dept: PREADMISSION TESTING | Facility: HOSPITAL | Age: 69
End: 2024-02-07
Payer: MEDICARE

## 2024-02-08 ENCOUNTER — TELEMEDICINE CLINICAL SUPPORT (OUTPATIENT)
Dept: PREADMISSION TESTING | Facility: HOSPITAL | Age: 69
End: 2024-02-08
Payer: MEDICARE

## 2024-02-08 VITALS — WEIGHT: 177 LBS | HEIGHT: 65 IN | BODY MASS INDEX: 29.49 KG/M2

## 2024-02-08 NOTE — NURSING NOTE
2/8/2024 Prescreening call completed.  Pt aware of his PAT appt on 2/23/2024 at 315pm.  Clayton HERNÁNDEZ

## 2024-02-08 NOTE — PREPROCEDURE INSTRUCTIONS
Medication List            Accurate as of February 8, 2024  4:12 PM. Always use your most recent med list.                cholecalciferol 50 MCG (2000 UT) tablet  Commonly known as: Vitamin D-3  Stop 7 days prior to surgery   MAGNESIUM GLYCINATE ORAL  Stop 7 days prior to surgery   Metamucil (sugar) powder  Generic drug: psyllium  Do not take day of surgery   triamterene-hydrochlorothiazid 37.5-25 mg capsule  Commonly known as: Dyazide  Take day of surgery                            NPO Instructions:    Do not eat any food after midnight the night before your surgery/procedure.    Additional Instructions:     Seven/Six Days before Surgery:  Review your medication instructions, stop indicated medications  Five Days before Surgery:  Review your medication instructions, stop indicated medications  Three Days before Surgery:  Review your medication instructions, stop indicated medications  The Day before Surgery:  Review your medication instructions, stop indicated medications  You will be contacted regarding the time of your arrival to facility and surgery time  Do not eat any food after Midnight  Day of Surgery:  Review your medication instructions, take indicated medications  Wear  comfortable loose fitting clothing  Do not use moisturizers, creams, lotions or perfume  All jewelry and valuables should be left at home  PAT DISCHARGE INSTRUCTIONS    Please call the Same Day Surgery (SDS) Department of the hospital where your procedure will be performed between 2:00- 3:30 PM the day before your surgery. If you are scheduled on a Monday, or a Tuesday following a Monday holiday, you will need to call on the last business day prior to your surgery.    [facility]  [address] [place] [address]  [phone]    Please let your surgeon know if:      You develop any open sores, shingles, burning or painful urination as these may increase your risk of an infection.   You no  longer wish to have the surgery.   Any other personal circumstances change that may lead to the need to cancel or defer this surgery-such as being sick or getting admitted to any hospital within one week of your planned procedure.    Your contact details change, such as a change of address or phone number.    Starting now:     Please DO NOT drink alcohol or smoke for 24 hours before surgery. It is well known that quitting smoking can make a huge difference to your health and recovery from surgery. The longer you abstain from smoking, the better your chances of a healthy recovery. If you need help with quitting, call 1-800-QUIT-NOW to be connected to a trained counselor who will discuss the best methods to help you quit.     Before your surgery:    Please stop all supplements 7 days prior to surgery. Or as directed by your surgeon.   Please stop taking NSAID pain medicine such as Advil and Motrin 7 days before surgery.    If you develop any fever, cough, cold, rashes, cuts, scratches, scrapes, urinary symptoms or infection anywhere on your body (including teeth and gums) prior to surgery, please call your surgeon’s office as soon as possible. This may require treatment to reduce the chance of cancellation on the day of surgery.    The day before your surgery:   DIET- Do not eat any food after MIDNIGHT.   Get a good night’s rest.  Use the special soap for bathing if you have been instructed to use one.    Scheduled surgery times may change and you will be notified if this occurs - please check your personal voicemail for any updates.     On the morning of surgery:   Wear comfortable, loose fitting clothes which open in the front. Please do not wear moisturizers, creams, lotions, makeup or perfume.    Please bring with you to surgery:   Photo ID and insurance card   Current list of medicines and allergies   Pacemaker/ Defibrillator/Heart stent cards   CPAP machine and mask    Slings/ splints/ crutches   A copy of your  complete advanced directive/DHPOA.    Please do NOT bring with you to surgery:   All jewelry and valuables should be left at home.   Prosthetic devices such as contact lenses, hearing aids, dentures, eyelash extensions, hairpins and body piercings must be removed prior to going in to the surgical suite.    After outpatient surgery:   A responsible adult MUST accompany you at the time of discharge and stay with you for 24 hours after your surgery. You may NOT drive yourself home after surgery.    Do not drive, operate machinery, make critical decisions or do activities that require co-ordination or balance until after a night’s sleep.   Do not drink alcoholic beverages for 24 hours.   Instructions for resuming your medications will be provided by your surgeon.    CALL YOUR DOCTOR AFTER SURGERY IF YOU HAVE:     Chills and/or a fever of 101° F or higher.    Redness, swelling, pus or drainage from your surgical wound or a bad smell from the wound.    Lightheadedness, fainting or confusion.    Persistent vomiting (throwing up) and are not able to eat or drink for 12 hours.    Three or more loose, watery bowel movements in 24 hours (diarrhea).   Difficulty or pain while urinating( after non-urological surgery)    Pain and swelling in your legs, especially if it is only on one side.    Difficulty breathing or are breathing faster than normal.    Any new concerning symptoms.      Reviewed pre-op instructions with patient, states understanding and denies further questions at this time.    If you have not received a call regarding your arrival time for surgery by 2pm on the day before surgery, you can call [phone].    Take Care  Reviewed Pre-operative Instruction Sheet.   Nothing to eat  or drink after midnight. No candy, water, gum or mints.  For safety, patient must have responsible licensed  to take you home and stay with you for 24 hours.  Shower. Wear clean comfortable clothing. No lotions or body creams.  Leave jewelry and valuables at home.  Notify surgeon of illness or in any changes to health.  Bring any medical equipment with you on day of surgery that surgeon has given.  Please review any medication instructions given prior to surgery.  No further questions at this time.

## 2024-02-23 ENCOUNTER — PRE-ADMISSION TESTING (OUTPATIENT)
Dept: PREADMISSION TESTING | Facility: HOSPITAL | Age: 69
End: 2024-02-23
Payer: MEDICARE

## 2024-02-23 VITALS
RESPIRATION RATE: 16 BRPM | SYSTOLIC BLOOD PRESSURE: 136 MMHG | HEART RATE: 69 BPM | DIASTOLIC BLOOD PRESSURE: 79 MMHG | BODY MASS INDEX: 30.6 KG/M2 | HEIGHT: 65 IN | OXYGEN SATURATION: 97 % | TEMPERATURE: 98.8 F | WEIGHT: 183.64 LBS

## 2024-02-23 DIAGNOSIS — N40.0 BENIGN PROSTATIC HYPERPLASIA, UNSPECIFIED WHETHER LOWER URINARY TRACT SYMPTOMS PRESENT: ICD-10-CM

## 2024-02-23 DIAGNOSIS — Z01.818 ENCOUNTER FOR PREADMISSION TESTING: Primary | ICD-10-CM

## 2024-02-23 LAB
ANION GAP SERPL CALC-SCNC: 15 MMOL/L (ref 10–20)
APPEARANCE UR: CLEAR
BILIRUB UR STRIP.AUTO-MCNC: NEGATIVE MG/DL
BUN SERPL-MCNC: 17 MG/DL (ref 6–23)
CALCIUM SERPL-MCNC: 9.1 MG/DL (ref 8.6–10.3)
CHLORIDE SERPL-SCNC: 100 MMOL/L (ref 98–107)
CO2 SERPL-SCNC: 25 MMOL/L (ref 21–32)
COLOR UR: YELLOW
CREAT SERPL-MCNC: 0.84 MG/DL (ref 0.5–1.3)
EGFRCR SERPLBLD CKD-EPI 2021: >90 ML/MIN/1.73M*2
ERYTHROCYTE [DISTWIDTH] IN BLOOD BY AUTOMATED COUNT: 10.9 % (ref 11.5–14.5)
GLUCOSE SERPL-MCNC: 82 MG/DL (ref 74–99)
GLUCOSE UR STRIP.AUTO-MCNC: NEGATIVE MG/DL
HCT VFR BLD AUTO: 42.5 % (ref 41–52)
HGB BLD-MCNC: 14.7 G/DL (ref 13.5–17.5)
KETONES UR STRIP.AUTO-MCNC: NEGATIVE MG/DL
LEUKOCYTE ESTERASE UR QL STRIP.AUTO: NEGATIVE
MCH RBC QN AUTO: 32.4 PG (ref 26–34)
MCHC RBC AUTO-ENTMCNC: 34.6 G/DL (ref 32–36)
MCV RBC AUTO: 94 FL (ref 80–100)
NITRITE UR QL STRIP.AUTO: NEGATIVE
NRBC BLD-RTO: ABNORMAL /100{WBCS}
PH UR STRIP.AUTO: 6 [PH]
PLATELET # BLD AUTO: 312 X10*3/UL (ref 150–450)
POTASSIUM SERPL-SCNC: 3.8 MMOL/L (ref 3.5–5.3)
PROT UR STRIP.AUTO-MCNC: NEGATIVE MG/DL
RBC # BLD AUTO: 4.54 X10*6/UL (ref 4.5–5.9)
RBC # UR STRIP.AUTO: NEGATIVE /UL
SODIUM SERPL-SCNC: 136 MMOL/L (ref 136–145)
SP GR UR STRIP.AUTO: 1.01
UROBILINOGEN UR STRIP.AUTO-MCNC: 0.2 MG/DL
WBC # BLD AUTO: 5.9 X10*3/UL (ref 4.4–11.3)

## 2024-02-23 PROCEDURE — 80048 BASIC METABOLIC PNL TOTAL CA: CPT

## 2024-02-23 PROCEDURE — 99203 OFFICE O/P NEW LOW 30 MIN: CPT | Performed by: NURSE PRACTITIONER

## 2024-02-23 PROCEDURE — 93005 ELECTROCARDIOGRAM TRACING: CPT

## 2024-02-23 PROCEDURE — 85027 COMPLETE CBC AUTOMATED: CPT

## 2024-02-23 PROCEDURE — 36415 COLL VENOUS BLD VENIPUNCTURE: CPT

## 2024-02-23 PROCEDURE — 81003 URINALYSIS AUTO W/O SCOPE: CPT

## 2024-02-23 PROCEDURE — 93010 ELECTROCARDIOGRAM REPORT: CPT | Performed by: INTERNAL MEDICINE

## 2024-02-23 ASSESSMENT — PAIN - FUNCTIONAL ASSESSMENT: PAIN_FUNCTIONAL_ASSESSMENT: 0-10

## 2024-02-23 ASSESSMENT — PAIN SCALES - GENERAL: PAINLEVEL_OUTOF10: 0 - NO PAIN

## 2024-02-23 NOTE — CPM/PAT H&P
CPM/PAT Evaluation       Name: Anthony Clark (Anthony Clark)  /Age: 1955/68 y.o.     In-Person       Chief Complaint: spermatocele     Patient is a 69 y/o alert and oriented male coming in for PAT for a right Spermatocelectomy scheduled on 3/8/24. Patient denies Cx pain, SOB, BACA, and NVDC. Patient also denies Hx DVT/PE. Current medications were reviewed and a presurgical medication schedule was provided. He has no questions at this time.     Past Medical History:   Diagnosis Date    Arrhythmia     nonsustained VT- had palpitations - last     Arthritis     Elevated prostate specific antigen (PSA) 2017    Increasing prostate specific antigen level    HL (hearing loss)     Lumbar disc disease     Meniere's disease     takes dyazide for this    Osteoarthritis of knee, unspecified 2022    Osteoarthritis of knee    Personal history of other diseases of male genital organs 2018    History of orchitis    Personal history of other diseases of the circulatory system     History of pericarditis-     Personal history of other diseases of the circulatory system     Personal history of cardiac arrhythmia    Personal history of other diseases of the musculoskeletal system and connective tissue     History of arthritis    Personal history of other specified conditions     History of chest pain -     Radiculopathy, cervical region 2018    Cervical radiculopathy    Spermatocele        Past Surgical History:   Procedure Laterality Date    HERNIA REPAIR  2014    Hernia Repair - umbilical    OTHER SURGICAL HISTORY  2019    Hydrocele repair    TONSILLECTOMY  2014    Tonsillectomy       Patient Sexual activity questions deferred to the physician.    Family History   Problem Relation Name Age of Onset    Heart failure Mother      Thyroid cancer Mother      Alzheimer's disease Father      Heart attack Mother's Brother  43        smoker    Coronary artery disease Other FAM Hx      "Gallbladder disease Other FAM Hx        No Known Allergies    Medication Documentation Review Audit       Reviewed by Verna Madrigal RN (Registered Nurse) on 02/23/24 at 1530      Medication Order Taking? Sig Documenting Provider Last Dose Status   cholecalciferol (Vitamin D-3) 50 MCG (2000 UT) tablet [id number] Yes Take 1 tablet (2,000 Units) by mouth once daily. Historical Provider, MD 2/22/2024 Active   MAGNESIUM GLYCINATE ORAL [id number] Yes Take 200 mg by mouth once daily. Historical Provider, MD 2/22/2024 Active   psyllium (Metamucil, sugar,) powder [id number] Yes Take 1 Dose (5.8 g) by mouth once daily. Historical Provider, MD 2/23/2024 Active   triamterene-hydrochlorothiazid (Dyazide) 37.5-25 mg capsule [id number] Yes Take 0.5 capsules by mouth once daily. Historical Provider, MD 2/23/2024 Active                     Visit Vitals  /79   Pulse 69   Temp 37.1 °C (98.8 °F) (Temporal)   Resp 16   Ht 1.65 m (5' 4.96\")   Wt 83.3 kg (183 lb 10.3 oz)   SpO2 97%   BMI 30.60 kg/m²   Smoking Status Former   BSA 1.95 m²        Review of Systems   Constitutional: Negative for chills, decreased appetite, diaphoresis, fever and malaise/fatigue.   Eyes:  Negative for blurred vision and double vision.   Cardiovascular:  Negative for chest pain, claudication, cyanosis, dyspnea on exertion, irregular heartbeat, leg swelling, near-syncope and palpitations.   Respiratory:  Negative for cough, hemoptysis, shortness of breath and wheezing.    Endocrine: Negative for cold intolerance, heat intolerance, polydipsia, polyphagia and polyuria.   Gastrointestinal:  Negative for abdominal pain, constipation, diarrhea, dysphagia, nausea and vomiting.   Genitourinary:  Negative for bladder incontinence, dysuria, hematuria, incomplete emptying, nocturia, pelvic pain and urgency.   Neurological:  Negative for headaches, light-headedness, paresthesias, sensory change and weakness.   Psychiatric/Behavioral:  Negative for altered mental status. "       Vitals and nursing note reviewed. Physical exam within normal limits.   Constitutional:       Appearance: Normal appearance. He is normal weight.   HENT:      Head: Normocephalic and atraumatic.      Mouth/Throat:      Mouth: Mucous membranes are moist.      Pharynx: Oropharynx is clear.   Eyes:      Extraocular Movements: Extraocular movements intact.      Conjunctiva/sclera: Conjunctivae normal.      Pupils: Pupils are equal, round, and reactive to light.   Cardiovascular:      Rate and Rhythm: Normal rate and regular rhythm.      Pulses: Normal pulses.      Heart sounds: Normal heart sounds.      No audible carotid bruit  Pulmonary:      Effort: Pulmonary effort is normal.      Breath sounds: Normal breath sounds.   Abdominal:      General: Abdomen is flat. Bowel sounds are normal.      Palpations: Abdomen is soft.   Musculoskeletal:      Cervical back: Normal range of motion and neck supple.   Skin:     General: Skin is warm and dry.      Capillary Refill: Capillary refill takes less than 2 seconds.   Neurological:      General: No focal deficit present.      Mental Status: He is alert and oriented to person, place, and time. Mental status is at baseline.   Psychiatric:         Mood and Affect: Mood normal.         Behavior: Behavior normal.         Thought Content: Thought content normal.         Judgment: Judgment normal.     PAT AIRWAY:   Airway:     Mallampati::  II    TM distance::  >3 FB    Neck ROM::  Full  Some missing and broken teeth throughout otherwise intact      NO PERSONAL REPORTS OF REACTIONS TO ANESTHESIA  NO PERSONAL REPORTS OF FAMILY HISTORY OF REACTIONS TO ANESTHESIA  NO PERSONAL REPORTS OF METAL, NICKEL, OR SHELLFISH ALLERGY  Former smoker quit in 1980s  NO DRUGS   ETOH 14 drinks per week      ASA II  RCRI-0 POINTS CLASS I RISK 3.9%  STOP-BANGS- 2 POINTS LOW RISK FOR BRIANNA  NSQIP-PREDICTED LENGTH OF STAY 0.5 DAYS  ARISCAT-3 POINTS LOW RISK 1.6%  DASI-42.7 POINTS. 7.99  METS  DIAMOND-0.1%  JHFRAT-3 POINTS HIGH RISK FOR FALLS      Assessment and Plan:   Spermatocele   right Spermatocelectomy scheduled on 3/8/24.   Hold NSAIDS  Hold herbal supplements  Abstain from ETOH  PAT TESTING-CBC, BMP, UA  EKG showed NSR @ 60  *CLEARED FOR SURGERY PENDING LABS/EKG   *FACE TO FACE TIME 20 MINUTES.     Meniere's disease  Follows with Dr. Vo last seen on 3/30/23  Continue on Dyazide as ordered    Hx of SVT  Follows with Dr. Brar last seen 9/27/22  Echo 10/27/22  EF 55-60%  EKG above

## 2024-02-23 NOTE — PREPROCEDURE INSTRUCTIONS
Medication List            Accurate as of February 23, 2024  3:31 PM. Always use your most recent med list.                cholecalciferol 50 MCG (2000 UT) tablet  Commonly known as: Vitamin D-3  Medication Adjustments for Surgery: Stop 7 days before surgery     MAGNESIUM GLYCINATE ORAL  Medication Adjustments for Surgery: Take morning of surgery with sip of water, no other fluids     Metamucil (sugar) powder  Generic drug: psyllium  Medication Adjustments for Surgery: Stop 1 day before surgery     triamterene-hydrochlorothiazid 37.5-25 mg capsule  Commonly known as: Dyazide  Medication Adjustments for Surgery: Take morning of surgery with sip of water, no other fluids                              NPO Instructions:    Do not eat any food after midnight the night before your surgery/procedure.    Additional Instructions:     Seven/Six Days before Surgery:  Review your medication instructions, stop indicated medications  Five Days before Surgery:  Review your medication instructions, stop indicated medications  Three Days before Surgery:  Review your medication instructions, stop indicated medications  The Day before Surgery:  Review your medication instructions, stop indicated medications  You will be contacted regarding the time of your arrival to facility and surgery time  Do not eat any food after Midnight  Day of Surgery:  Review your medication instructions, take indicated medications  Wear  comfortable loose fitting clothing  Do not use moisturizers, creams, lotions or perfume  All jewelry and valuables should be left at home

## 2024-02-23 NOTE — H&P (VIEW-ONLY)
CPM/PAT Evaluation       Name: Anthony Clark (Anthony Clark)  /Age: 1955/68 y.o.     In-Person       Chief Complaint: spermatocele     Patient is a 69 y/o alert and oriented male coming in for PAT for a right Spermatocelectomy scheduled on 3/8/24. Patient denies Cx pain, SOB, BACA, and NVDC. Patient also denies Hx DVT/PE. Current medications were reviewed and a presurgical medication schedule was provided. He has no questions at this time.     Past Medical History:   Diagnosis Date    Arrhythmia     nonsustained VT- had palpitations - last     Arthritis     Elevated prostate specific antigen (PSA) 2017    Increasing prostate specific antigen level    HL (hearing loss)     Lumbar disc disease     Meniere's disease     takes dyazide for this    Osteoarthritis of knee, unspecified 2022    Osteoarthritis of knee    Personal history of other diseases of male genital organs 2018    History of orchitis    Personal history of other diseases of the circulatory system     History of pericarditis-     Personal history of other diseases of the circulatory system     Personal history of cardiac arrhythmia    Personal history of other diseases of the musculoskeletal system and connective tissue     History of arthritis    Personal history of other specified conditions     History of chest pain -     Radiculopathy, cervical region 2018    Cervical radiculopathy    Spermatocele        Past Surgical History:   Procedure Laterality Date    HERNIA REPAIR  2014    Hernia Repair - umbilical    OTHER SURGICAL HISTORY  2019    Hydrocele repair    TONSILLECTOMY  2014    Tonsillectomy       Patient Sexual activity questions deferred to the physician.    Family History   Problem Relation Name Age of Onset    Heart failure Mother      Thyroid cancer Mother      Alzheimer's disease Father      Heart attack Mother's Brother  43        smoker    Coronary artery disease Other FAM Hx      "Gallbladder disease Other FAM Hx        No Known Allergies    Medication Documentation Review Audit       Reviewed by Verna Madrigal RN (Registered Nurse) on 02/23/24 at 1530      Medication Order Taking? Sig Documenting Provider Last Dose Status   cholecalciferol (Vitamin D-3) 50 MCG (2000 UT) tablet [id number] Yes Take 1 tablet (2,000 Units) by mouth once daily. Historical Provider, MD 2/22/2024 Active   MAGNESIUM GLYCINATE ORAL [id number] Yes Take 200 mg by mouth once daily. Historical Provider, MD 2/22/2024 Active   psyllium (Metamucil, sugar,) powder [id number] Yes Take 1 Dose (5.8 g) by mouth once daily. Historical Provider, MD 2/23/2024 Active   triamterene-hydrochlorothiazid (Dyazide) 37.5-25 mg capsule [id number] Yes Take 0.5 capsules by mouth once daily. Historical Provider, MD 2/23/2024 Active                     Visit Vitals  /79   Pulse 69   Temp 37.1 °C (98.8 °F) (Temporal)   Resp 16   Ht 1.65 m (5' 4.96\")   Wt 83.3 kg (183 lb 10.3 oz)   SpO2 97%   BMI 30.60 kg/m²   Smoking Status Former   BSA 1.95 m²        Review of Systems   Constitutional: Negative for chills, decreased appetite, diaphoresis, fever and malaise/fatigue.   Eyes:  Negative for blurred vision and double vision.   Cardiovascular:  Negative for chest pain, claudication, cyanosis, dyspnea on exertion, irregular heartbeat, leg swelling, near-syncope and palpitations.   Respiratory:  Negative for cough, hemoptysis, shortness of breath and wheezing.    Endocrine: Negative for cold intolerance, heat intolerance, polydipsia, polyphagia and polyuria.   Gastrointestinal:  Negative for abdominal pain, constipation, diarrhea, dysphagia, nausea and vomiting.   Genitourinary:  Negative for bladder incontinence, dysuria, hematuria, incomplete emptying, nocturia, pelvic pain and urgency.   Neurological:  Negative for headaches, light-headedness, paresthesias, sensory change and weakness.   Psychiatric/Behavioral:  Negative for altered mental status. "       Vitals and nursing note reviewed. Physical exam within normal limits.   Constitutional:       Appearance: Normal appearance. He is normal weight.   HENT:      Head: Normocephalic and atraumatic.      Mouth/Throat:      Mouth: Mucous membranes are moist.      Pharynx: Oropharynx is clear.   Eyes:      Extraocular Movements: Extraocular movements intact.      Conjunctiva/sclera: Conjunctivae normal.      Pupils: Pupils are equal, round, and reactive to light.   Cardiovascular:      Rate and Rhythm: Normal rate and regular rhythm.      Pulses: Normal pulses.      Heart sounds: Normal heart sounds.      No audible carotid bruit  Pulmonary:      Effort: Pulmonary effort is normal.      Breath sounds: Normal breath sounds.   Abdominal:      General: Abdomen is flat. Bowel sounds are normal.      Palpations: Abdomen is soft.   Musculoskeletal:      Cervical back: Normal range of motion and neck supple.   Skin:     General: Skin is warm and dry.      Capillary Refill: Capillary refill takes less than 2 seconds.   Neurological:      General: No focal deficit present.      Mental Status: He is alert and oriented to person, place, and time. Mental status is at baseline.   Psychiatric:         Mood and Affect: Mood normal.         Behavior: Behavior normal.         Thought Content: Thought content normal.         Judgment: Judgment normal.     PAT AIRWAY:   Airway:     Mallampati::  II    TM distance::  >3 FB    Neck ROM::  Full  Some missing and broken teeth throughout otherwise intact      NO PERSONAL REPORTS OF REACTIONS TO ANESTHESIA  NO PERSONAL REPORTS OF FAMILY HISTORY OF REACTIONS TO ANESTHESIA  NO PERSONAL REPORTS OF METAL, NICKEL, OR SHELLFISH ALLERGY  Former smoker quit in 1980s  NO DRUGS   ETOH 14 drinks per week      ASA II  RCRI-0 POINTS CLASS I RISK 3.9%  STOP-BANGS- 2 POINTS LOW RISK FOR BRIANNA  NSQIP-PREDICTED LENGTH OF STAY 0.5 DAYS  ARISCAT-3 POINTS LOW RISK 1.6%  DASI-42.7 POINTS. 7.99  METS  DIAMOND-0.1%  JHFRAT-3 POINTS HIGH RISK FOR FALLS      Assessment and Plan:   Spermatocele   right Spermatocelectomy scheduled on 3/8/24.   Hold NSAIDS  Hold herbal supplements  Abstain from ETOH  PAT TESTING-CBC, BMP, UA  EKG showed NSR @ 60  *CLEARED FOR SURGERY PENDING LABS/EKG   *FACE TO FACE TIME 20 MINUTES.     Meniere's disease  Follows with Dr. Vo last seen on 3/30/23  Continue on Dyazide as ordered    Hx of SVT  Follows with Dr. Brar last seen 9/27/22  Echo 10/27/22  EF 55-60%  EKG above

## 2024-02-24 LAB — HOLD SPECIMEN: NORMAL

## 2024-02-26 LAB
ATRIAL RATE: 60 BPM
P AXIS: 16 DEGREES
P OFFSET: 183 MS
P ONSET: 128 MS
PR INTERVAL: 182 MS
Q ONSET: 219 MS
QRS COUNT: 10 BEATS
QRS DURATION: 88 MS
QT INTERVAL: 426 MS
QTC CALCULATION(BAZETT): 426 MS
QTC FREDERICIA: 426 MS
R AXIS: -22 DEGREES
T AXIS: -4 DEGREES
T OFFSET: 432 MS
VENTRICULAR RATE: 60 BPM

## 2024-02-27 DIAGNOSIS — H81.09 MENIERE'S DISEASE, UNSPECIFIED LATERALITY: Primary | ICD-10-CM

## 2024-02-27 RX ORDER — TRIAMTERENE/HYDROCHLOROTHIAZID 37.5-25 MG
0.5 TABLET ORAL DAILY
Qty: 45 TABLET | Refills: 0 | Status: SHIPPED | OUTPATIENT
Start: 2024-02-27 | End: 2024-06-03 | Stop reason: SDUPTHER

## 2024-03-07 ENCOUNTER — ANESTHESIA EVENT (OUTPATIENT)
Dept: OPERATING ROOM | Facility: HOSPITAL | Age: 69
End: 2024-03-07
Payer: MEDICARE

## 2024-03-08 ENCOUNTER — ANESTHESIA (OUTPATIENT)
Dept: OPERATING ROOM | Facility: HOSPITAL | Age: 69
End: 2024-03-08
Payer: MEDICARE

## 2024-03-08 ENCOUNTER — HOSPITAL ENCOUNTER (OUTPATIENT)
Facility: HOSPITAL | Age: 69
Setting detail: OUTPATIENT SURGERY
Discharge: HOME | End: 2024-03-08
Attending: UROLOGY | Admitting: UROLOGY
Payer: MEDICARE

## 2024-03-08 VITALS
OXYGEN SATURATION: 98 % | TEMPERATURE: 97.7 F | SYSTOLIC BLOOD PRESSURE: 135 MMHG | RESPIRATION RATE: 20 BRPM | HEART RATE: 64 BPM | DIASTOLIC BLOOD PRESSURE: 78 MMHG | BODY MASS INDEX: 30.12 KG/M2 | WEIGHT: 180.78 LBS | HEIGHT: 65 IN

## 2024-03-08 DIAGNOSIS — N43.40 SPERMATOCELE: Primary | ICD-10-CM

## 2024-03-08 PROCEDURE — 2720000007 HC OR 272 NO HCPCS: Performed by: UROLOGY

## 2024-03-08 PROCEDURE — A54840 PR EXCIS SPERMATOCELE: Performed by: STUDENT IN AN ORGANIZED HEALTH CARE EDUCATION/TRAINING PROGRAM

## 2024-03-08 PROCEDURE — 3700000001 HC GENERAL ANESTHESIA TIME - INITIAL BASE CHARGE: Performed by: UROLOGY

## 2024-03-08 PROCEDURE — 0752T DGTZ GLS MCRSCP SLD LVL III: CPT | Mod: TC,SUR,BEALAB,WESLAB | Performed by: UROLOGY

## 2024-03-08 PROCEDURE — 3700000002 HC GENERAL ANESTHESIA TIME - EACH INCREMENTAL 1 MINUTE: Performed by: UROLOGY

## 2024-03-08 PROCEDURE — 2500000001 HC RX 250 WO HCPCS SELF ADMINISTERED DRUGS (ALT 637 FOR MEDICARE OP): Performed by: UROLOGY

## 2024-03-08 PROCEDURE — 3600000007 HC OR TIME - EACH INCREMENTAL 1 MINUTE - PROCEDURE LEVEL TWO: Performed by: UROLOGY

## 2024-03-08 PROCEDURE — 7100000002 HC RECOVERY ROOM TIME - EACH INCREMENTAL 1 MINUTE: Performed by: UROLOGY

## 2024-03-08 PROCEDURE — 2500000005 HC RX 250 GENERAL PHARMACY W/O HCPCS: Performed by: ANESTHESIOLOGIST ASSISTANT

## 2024-03-08 PROCEDURE — 54840 REMOVE EPIDIDYMIS LESION: CPT | Performed by: UROLOGY

## 2024-03-08 PROCEDURE — 7100000001 HC RECOVERY ROOM TIME - INITIAL BASE CHARGE: Performed by: UROLOGY

## 2024-03-08 PROCEDURE — 2500000004 HC RX 250 GENERAL PHARMACY W/ HCPCS (ALT 636 FOR OP/ED): Performed by: UROLOGY

## 2024-03-08 PROCEDURE — 7100000009 HC PHASE TWO TIME - INITIAL BASE CHARGE: Performed by: UROLOGY

## 2024-03-08 PROCEDURE — A4649 SURGICAL SUPPLIES: HCPCS | Performed by: UROLOGY

## 2024-03-08 PROCEDURE — 7100000010 HC PHASE TWO TIME - EACH INCREMENTAL 1 MINUTE: Performed by: UROLOGY

## 2024-03-08 PROCEDURE — A54840 PR EXCIS SPERMATOCELE: Performed by: ANESTHESIOLOGIST ASSISTANT

## 2024-03-08 PROCEDURE — 88304 TISSUE EXAM BY PATHOLOGIST: CPT | Performed by: PATHOLOGY

## 2024-03-08 PROCEDURE — 3600000002 HC OR TIME - INITIAL BASE CHARGE - PROCEDURE LEVEL TWO: Performed by: UROLOGY

## 2024-03-08 PROCEDURE — 2500000004 HC RX 250 GENERAL PHARMACY W/ HCPCS (ALT 636 FOR OP/ED): Performed by: ANESTHESIOLOGIST ASSISTANT

## 2024-03-08 PROCEDURE — 2500000005 HC RX 250 GENERAL PHARMACY W/O HCPCS: Performed by: UROLOGY

## 2024-03-08 RX ORDER — PROPOFOL 10 MG/ML
INJECTION, EMULSION INTRAVENOUS AS NEEDED
Status: DISCONTINUED | OUTPATIENT
Start: 2024-03-08 | End: 2024-03-08

## 2024-03-08 RX ORDER — ACETAMINOPHEN 325 MG/1
650 TABLET ORAL EVERY 4 HOURS PRN
Status: DISCONTINUED | OUTPATIENT
Start: 2024-03-08 | End: 2024-03-08 | Stop reason: HOSPADM

## 2024-03-08 RX ORDER — ACETAMINOPHEN 325 MG/1
975 TABLET ORAL ONCE
Status: COMPLETED | OUTPATIENT
Start: 2024-03-08 | End: 2024-03-08

## 2024-03-08 RX ORDER — LIDOCAINE HYDROCHLORIDE 10 MG/ML
INJECTION, SOLUTION EPIDURAL; INFILTRATION; INTRACAUDAL; PERINEURAL AS NEEDED
Status: DISCONTINUED | OUTPATIENT
Start: 2024-03-08 | End: 2024-03-08

## 2024-03-08 RX ORDER — ONDANSETRON HYDROCHLORIDE 2 MG/ML
INJECTION, SOLUTION INTRAVENOUS AS NEEDED
Status: DISCONTINUED | OUTPATIENT
Start: 2024-03-08 | End: 2024-03-08

## 2024-03-08 RX ORDER — ALBUTEROL SULFATE 0.83 MG/ML
2.5 SOLUTION RESPIRATORY (INHALATION) ONCE AS NEEDED
Status: DISCONTINUED | OUTPATIENT
Start: 2024-03-08 | End: 2024-03-08 | Stop reason: HOSPADM

## 2024-03-08 RX ORDER — BUPIVACAINE HYDROCHLORIDE 2.5 MG/ML
INJECTION, SOLUTION INFILTRATION; PERINEURAL AS NEEDED
Status: DISCONTINUED | OUTPATIENT
Start: 2024-03-08 | End: 2024-03-08 | Stop reason: HOSPADM

## 2024-03-08 RX ORDER — MIDAZOLAM HYDROCHLORIDE 1 MG/ML
INJECTION, SOLUTION INTRAMUSCULAR; INTRAVENOUS AS NEEDED
Status: DISCONTINUED | OUTPATIENT
Start: 2024-03-08 | End: 2024-03-08

## 2024-03-08 RX ORDER — FENTANYL CITRATE 50 UG/ML
INJECTION, SOLUTION INTRAMUSCULAR; INTRAVENOUS AS NEEDED
Status: DISCONTINUED | OUTPATIENT
Start: 2024-03-08 | End: 2024-03-08

## 2024-03-08 RX ORDER — GABAPENTIN 300 MG/1
600 CAPSULE ORAL ONCE
Status: COMPLETED | OUTPATIENT
Start: 2024-03-08 | End: 2024-03-08

## 2024-03-08 RX ORDER — SODIUM CHLORIDE, SODIUM LACTATE, POTASSIUM CHLORIDE, CALCIUM CHLORIDE 600; 310; 30; 20 MG/100ML; MG/100ML; MG/100ML; MG/100ML
100 INJECTION, SOLUTION INTRAVENOUS CONTINUOUS
Status: DISCONTINUED | OUTPATIENT
Start: 2024-03-08 | End: 2024-03-08 | Stop reason: HOSPADM

## 2024-03-08 RX ORDER — OXYCODONE HYDROCHLORIDE 5 MG/1
10 TABLET ORAL EVERY 4 HOURS PRN
Status: DISCONTINUED | OUTPATIENT
Start: 2024-03-08 | End: 2024-03-08 | Stop reason: HOSPADM

## 2024-03-08 RX ORDER — OXYCODONE HYDROCHLORIDE 5 MG/1
5 TABLET ORAL EVERY 4 HOURS PRN
Status: DISCONTINUED | OUTPATIENT
Start: 2024-03-08 | End: 2024-03-08 | Stop reason: HOSPADM

## 2024-03-08 RX ORDER — DEXAMETHASONE SODIUM PHOSPHATE 4 MG/ML
INJECTION, SOLUTION INTRA-ARTICULAR; INTRALESIONAL; INTRAMUSCULAR; INTRAVENOUS; SOFT TISSUE AS NEEDED
Status: DISCONTINUED | OUTPATIENT
Start: 2024-03-08 | End: 2024-03-08

## 2024-03-08 RX ORDER — CELECOXIB 200 MG/1
400 CAPSULE ORAL ONCE
Status: COMPLETED | OUTPATIENT
Start: 2024-03-08 | End: 2024-03-08

## 2024-03-08 RX ORDER — LIDOCAINE HYDROCHLORIDE 10 MG/ML
INJECTION INFILTRATION; PERINEURAL AS NEEDED
Status: DISCONTINUED | OUTPATIENT
Start: 2024-03-08 | End: 2024-03-08 | Stop reason: HOSPADM

## 2024-03-08 RX ORDER — ONDANSETRON HYDROCHLORIDE 2 MG/ML
4 INJECTION, SOLUTION INTRAVENOUS ONCE AS NEEDED
Status: DISCONTINUED | OUTPATIENT
Start: 2024-03-08 | End: 2024-03-08 | Stop reason: HOSPADM

## 2024-03-08 RX ORDER — CEFAZOLIN SODIUM 2 G/100ML
2 INJECTION, SOLUTION INTRAVENOUS ONCE
Status: COMPLETED | OUTPATIENT
Start: 2024-03-08 | End: 2024-03-08

## 2024-03-08 RX ORDER — DOCUSATE SODIUM 100 MG/1
100 CAPSULE, LIQUID FILLED ORAL 2 TIMES DAILY PRN
Qty: 14 CAPSULE | Refills: 0 | Status: SHIPPED | OUTPATIENT
Start: 2024-03-08 | End: 2024-03-15

## 2024-03-08 RX ORDER — OXYCODONE HYDROCHLORIDE 5 MG/1
5 TABLET ORAL EVERY 6 HOURS PRN
Qty: 8 TABLET | Refills: 0 | Status: SHIPPED | OUTPATIENT
Start: 2024-03-08 | End: 2024-03-11

## 2024-03-08 RX ADMIN — CEFAZOLIN SODIUM 2 G: 2 INJECTION, SOLUTION INTRAVENOUS at 07:10

## 2024-03-08 RX ADMIN — ACETAMINOPHEN 975 MG: 325 TABLET ORAL at 05:57

## 2024-03-08 RX ADMIN — MIDAZOLAM 2 MG: 1 INJECTION INTRAMUSCULAR; INTRAVENOUS at 07:00

## 2024-03-08 RX ADMIN — FENTANYL CITRATE 50 MCG: 0.05 INJECTION, SOLUTION INTRAMUSCULAR; INTRAVENOUS at 07:24

## 2024-03-08 RX ADMIN — PROPOFOL 200 MG: 10 INJECTION, EMULSION INTRAVENOUS at 07:08

## 2024-03-08 RX ADMIN — ONDANSETRON 4 MG: 2 INJECTION INTRAMUSCULAR; INTRAVENOUS at 08:05

## 2024-03-08 RX ADMIN — DEXAMETHASONE SODIUM PHOSPHATE 4 MG: 4 INJECTION, SOLUTION INTRAMUSCULAR; INTRAVENOUS at 07:15

## 2024-03-08 RX ADMIN — LIDOCAINE HYDROCHLORIDE 50 MG: 10 INJECTION, SOLUTION EPIDURAL; INFILTRATION; INTRACAUDAL; PERINEURAL at 07:08

## 2024-03-08 RX ADMIN — PROPOFOL 200 MG: 10 INJECTION, EMULSION INTRAVENOUS at 07:15

## 2024-03-08 RX ADMIN — GABAPENTIN 600 MG: 300 CAPSULE ORAL at 05:57

## 2024-03-08 RX ADMIN — SODIUM CHLORIDE, SODIUM LACTATE, POTASSIUM CHLORIDE, AND CALCIUM CHLORIDE 100 ML/HR: 600; 310; 30; 20 INJECTION, SOLUTION INTRAVENOUS at 05:52

## 2024-03-08 RX ADMIN — CELECOXIB 400 MG: 200 CAPSULE ORAL at 05:57

## 2024-03-08 ASSESSMENT — COLUMBIA-SUICIDE SEVERITY RATING SCALE - C-SSRS
6. HAVE YOU EVER DONE ANYTHING, STARTED TO DO ANYTHING, OR PREPARED TO DO ANYTHING TO END YOUR LIFE?: NO
1. IN THE PAST MONTH, HAVE YOU WISHED YOU WERE DEAD OR WISHED YOU COULD GO TO SLEEP AND NOT WAKE UP?: NO
2. HAVE YOU ACTUALLY HAD ANY THOUGHTS OF KILLING YOURSELF?: NO

## 2024-03-08 ASSESSMENT — PAIN - FUNCTIONAL ASSESSMENT
PAIN_FUNCTIONAL_ASSESSMENT: 0-10

## 2024-03-08 ASSESSMENT — PAIN SCALES - GENERAL
PAINLEVEL_OUTOF10: 2
PAINLEVEL_OUTOF10: 0 - NO PAIN
PAINLEVEL_OUTOF10: 2

## 2024-03-08 NOTE — OP NOTE
Spermatocelectomy (with Operating Microscope) (R) Operative Note     Date: 3/8/2024  OR Location: [facility]    Name: Anthony Clark : 1955, Age: 68 y.o., MRN: [id number], Sex: male    Diagnosis  Pre-op Diagnosis     * Spermatocele [N43.40] Post-op Diagnosis     * Spermatocele [N43.40]     Procedures  Spermatocelectomy (with Operating Microscope)  33006 - [place] EXCISION SPERMATOCELE W/WO EPIDIDYMECTOMY    [place] MICROSURG TQS REQ USE OPERATING MICROSCOPE [70528]  Surgeons      * Sujatha Arizmendi - Primary    Resident/Fellow/Other Assistant:  Surgeon(s) and Role:     * Angie Hannah DO - Resident - Assisting    Procedure Summary  Anesthesia: General  ASA: III  Anesthesia Staff: Anesthesiologist: Hillary Chen MD  C-AA: MASOUD Barnes  Anesthesia Break User: MASOUD Fofana  Estimated Blood Loss: 5 mL  Intra-op Medications:   Administrations occurring from 0700 to 0900 on 24:   Medication Name Total Dose   BUPivacaine HCl (Marcaine) 0.25 % (2.5 mg/mL) injection 10 mL   lidocaine (Xylocaine) 10 mg/mL (1 %) injection 10 mL   lactated Ringer's infusion Cannot be calculated   ceFAZolin in dextrose (iso-os) (Ancef) IVPB 2 g 2 g              Anesthesia Record               Intraprocedure I/O Totals          Intake    lactated Ringer's infusion 500.00 mL    ceFAZolin in dextrose (iso-os) (Ancef) IVPB 2 g 100.00 mL    Total Intake 600 mL       Output    Est. Blood Loss 20 mL    Total Output 20 mL       Net    Net Volume 580 mL          Specimen:   ID Type Source Tests Collected by Time   1 : RIGHT SPERMATOCELE SAC Tissue SPERMATOCELE RIGHT SURGICAL PATHOLOGY EXAM Sujatha Arizmendi MD 3/8/2024 0744        Staff:   Circulator: Jahaira Tapia RN  Relief Scrub: Wilfred Mckeon  Scrub Person: Payam Fisher         Drains and/or Catheters:   Closed/Suction Drain Groin Other (Comment) 10 Fr. (Active)   Dressing Status Clean 24 0834       Tourniquet Times:         Implants:     Findings:   Right  spermatocele excised  Excellent hemostasis    Indications: Anthony Clark is an 68 y.o. male who is having surgery for Spermatocele [N43.40].     The patient was seen in the preoperative area. The risks, benefits, complications, treatment options, non-operative alternatives, expected recovery and outcomes were discussed with the patient. The possibilities of reaction to medication, pulmonary aspiration, injury to surrounding structures, bleeding, recurrent infection, the need for additional procedures, failure to diagnose a condition, and creating a complication requiring transfusion or operation were discussed with the patient. The patient concurred with the proposed plan, giving informed consent.  The site of surgery was properly noted/marked if necessary per policy. The patient has been actively warmed in preoperative area. Preoperative antibiotics have been ordered and given within 1 hours of incision. Venous thrombosis prophylaxis have been ordered including bilateral sequential compression devices    Procedure Details: The patient was seen in the preoperative area, where the risks, benefits, and indications were discussed with the patient.  The patient agreed to proceed with the procedure. Allergies and medications were reviewed. At this time, he was taken back to the operating room, where general anesthesia was induced.  He was placed in supine position.  He was prepped and draped in a standard sterile fashion.      Subsequently, we made 3.5 cm transverse incision over the right hemiscrotum and dissected down to the spermatocele.  The right testicle with associated spermatocele was then delivered. Spermatocele was then drained. The excess spermatocele sac was resected.  The edges were all cauterized using bovie electrocautery.   Microscope moved onto the field however, due to previous right hydrocele surgery the origin of the spermatocele was unable to be visualized. Meticulous hemostasis obtained. TAI drain  placed through right groin stab incision and sutures with 2-0 nylon. We subsequently used a 3-0 Vicryl to close the dartos and a 4-0 Monocryl in simple horizontal mattress sutures to close the skin.  This concluded the end of the procedure.  Dermabond was applied to the incision.  Fluffs and jockstrap were provided.  The patient was awoken from anesthesia and taken to PACU in stable condition.    Complications:  None; patient tolerated the procedure well.    Disposition: PACU - hemodynamically stable.  Condition: stable     Additional Details: Follow-up 3/14 for drain removal.    Attending Attestation:     Sujatha Arizmendi  Phone Number: [phone]

## 2024-03-08 NOTE — ANESTHESIA PREPROCEDURE EVALUATION
Patient: Anthony Clark    Procedure Information       Date/Time: 03/08/24 0700    Procedure: Spermatocelectomy (with Operating Microscope) (Right) - with Operating Microscope    Location: [facility] / [facility]    Surgeons: Sujatha Arizmendi MD            Relevant Problems   Anesthesia (within normal limits)      Cardiovascular (within normal limits)      Endocrine (within normal limits)      GI (within normal limits)      /Renal (within normal limits)      Neuro/Psych   (+) Entrapment neuropathy   (+) Lumbar radiculopathy   (+) Ballard's neuroma of left foot      Pulmonary (within normal limits)      GI/Hepatic (within normal limits)      Hematology (within normal limits)      Musculoskeletal   (+) Localized primary osteoarthritis of carpometacarpal joint of left thumb   (+) 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   (+) Primary osteoarthritis of left knee      Eyes, Ears, Nose, and Throat   (+) Bilateral sensorineural hearing loss      Infectious Disease (within normal limits)     Past Medical History:   Diagnosis Date    Arrhythmia     nonsustained VT- had palpitations - last 2022    Arthritis     Elevated prostate specific antigen (PSA) 02/23/2017    Increasing prostate specific antigen level    HL (hearing loss)     Lumbar disc disease     Meniere's disease     takes dyazide for this    Osteoarthritis of knee, unspecified 01/06/2022    Osteoarthritis of knee    Personal history of other diseases of male genital organs 03/05/2018    History of orchitis    Personal history of other diseases of the circulatory system     History of pericarditis- 2004    Personal history of other diseases of the circulatory system     Personal history of cardiac arrhythmia    Personal history of other diseases of the musculoskeletal system and connective tissue     History of arthritis    Personal history of other specified conditions     History of chest pain - 2022    Radiculopathy, cervical  region 02/27/2018    Cervical radiculopathy    Spermatocele      Past Surgical History:   Procedure Laterality Date    HERNIA REPAIR  06/12/2014    Hernia Repair - umbilical    OTHER SURGICAL HISTORY  02/08/2019    Hydrocele repair    TONSILLECTOMY  06/12/2014    Tonsillectomy     No Known Allergies    Clinical information reviewed:   Tobacco  Allergies  Meds   Med Hx  Surg Hx   Fam Hx  Soc Hx        NPO Detail:  NPO/Void Status  Date of Last Liquid: 03/07/24  Time of Last Liquid: 2300  Date of Last Solid: 03/07/24  Time of Last Solid: 1800  Time of Last Void: 0500         Physical Exam    Airway  Mallampati: II  TM distance: >3 FB  Neck ROM: full     Cardiovascular   Rhythm: regular  Rate: normal     Dental    Pulmonary   Breath sounds clear to auscultation     Abdominal            Anesthesia Plan    History of general anesthesia?: yes  History of complications of general anesthesia?: no    ASA 3     general     intravenous induction   Postoperative administration of opioids is intended.  Trial extubation is planned.  Anesthetic plan and risks discussed with patient.  Use of blood products discussed with patient who.

## 2024-03-08 NOTE — ANESTHESIA PROCEDURE NOTES
Airway  Date/Time: 3/8/2024 7:09 AM  Urgency: elective      Staffing  Performed: MASOUD   Authorized by: Hillary Chen MD    Performed by: MASOUD Barnes  Patient location during procedure: OR    Indications and Patient Condition  Indications for airway management: anesthesia  Preoxygenated: yes  Mask difficulty assessment: 1 - vent by mask    Final Airway Details  Final airway type: supraglottic airway      Successful airway: classic  Size 4     Number of attempts at approach: 1

## 2024-03-08 NOTE — INTERVAL H&P NOTE
H&P reviewed. The patient was examined and there are no changes to the H&P.    Angie Hannah DO  Urology Resident, PGY-3

## 2024-03-08 NOTE — ANESTHESIA POSTPROCEDURE EVALUATION
Patient: Anthony Clark    Procedure Summary       Date: 03/08/24 Room / Location: [facility] / [facility]    Anesthesia Start: 0700 Anesthesia Stop: 0831    Procedure: Spermatocelectomy (with Operating Microscope) (Right) Diagnosis:       Spermatocele      (Spermatocele [N43.40])    Surgeons: Sujatha Arizmendi MD Responsible Provider: Hillary Chen MD    Anesthesia Type: general ASA Status: 3            Anesthesia Type: general    Vitals Value Taken Time   /82 03/08/24 0843   Temp 36.2 °C (97.2 °F) 03/08/24 0828   Pulse 72 03/08/24 0843   Resp 16 03/08/24 0843   SpO2 96 % 03/08/24 0843       Anesthesia Post Evaluation    Patient location during evaluation: bedside  Patient participation: complete - patient participated  Level of consciousness: awake and alert  Pain management: adequate  Multimodal analgesia pain management approach  Airway patency: patent  Cardiovascular status: acceptable  Respiratory status: acceptable  Hydration status: acceptable  Postoperative Nausea and Vomiting: none        There were no known notable events for this encounter.

## 2024-03-08 NOTE — PERIOPERATIVE NURSING NOTE
Patient in Phase 2; dressed and up to chair with RN assist. Tolerating po fluids, minimal complaint of pain and no complaint of nausea.     Significant other at bedside; discussed discharge instructions with patient and Significant other. All questions at this time answered.     Patient clinically appropriate for discharge. IV removed and patient transported to discharge area via wheelchair.

## 2024-03-08 NOTE — DISCHARGE INSTRUCTIONS
DEPARTMENT OF UROLOGY  DISCHARGE INSTRUCTIONS HYDROCELECTOMY/SPERMATOCELECTOMY  Outpatient Surgery    C O N F I D E N T I A L   I N F O R M A T I O N    Anthony Clark      Call [phone] during regular daytime business hours (8:00 am - 5:00 pm) and after 5:00 pm and ask for the Urology Resident with any questions or concerns.      If it is a life-threatening situation, proceed to the nearest emergency department.        Follow-up appointment:  3/11/24     Thank you for the opportunity to care for you today.  Your health and healing are very important to us.  We hope we made you feel as comfortable as possible and are committed to your recovery and continued well-being.      The following is a brief overview of your hydrocelectomy surgery today. Some of the information contained on this summary may be confidential.  This information should be kept in your records and should be shared with your regular doctor.    Physicians:   Dr. Arizmendi      Procedure performed: removal of hydrocele/spermatocele    What to Expect During your Recovery and Home Care  Anesthesia Side Effects   You received general anesthesia today.  You may feel sleepy, tired, or have a sore throat.   You may also feel drowsiness, dizziness, or inability to think clearly.  For your safety, do not drive, drink alcoholic beverages, take any unprescribed medication or make any important decisions for 24 hours.  A responsible adult should be with you for 24 hours.        Activity and Recovery    No heavy lifting for 7-10 days. Wear supportive underwear (jock strap or snug briefs) to help support scrotum. Elevate your scrotum with towel when laying down to prevent swelling. The more you are up and moving around the more your scrotum will swell. Place an ice pack on top of underwear, never put ice pack directly on skin. Do not leave ice pack on longer then 10 to 15 minutes. Ice area of and on over the next few days.     Do not drive or operate heavy  machinery while taking narcotic pain medications as these medications can alter perception, impair judgement, and slow reaction times.    Pain Control  Unfortunately, you may experience pain after your procedure.  Adequate management can include alternative measures to help ease your pain and can include ice packs, over the counter Tylenol or Oxycodone can be taken as prescribed as needed for breakthrough pain. Do not take more then 4,000mg of Tylenol in a 24-hour period.     Oxycodone is a narcotic and can become addictive and may also induce constipation.  Please take MiraLax and stool softeners when taking this medication to prevent constipation.    Nausea/Vomiting   Clear liquids are best tolerated at first. Start slow, advance your diet as tolerated to normal foods. Avoid spicy, greasy, heavy foods at first. Also, you may feel nauseous or like you need to vomit if you take any type of medication on an empty stomach.  Call your physician if you are unable to eat or drink and have persistent vomiting.    Signs of Bleeding   Minor bleeding or drainage may occur from the surgical incision; however, excessive or consistent bleeding should be reported to your surgeon. Excessive bleeding is defined as blood that is dripping from wound, soaking you bandages, and is ketchup colored, thick with possible blood clots.  Consistent is defined as bleeding that does not stop.      Treatment/wound care:   Keep area(s) clean and dry.   It is okay to shower 24 hours after time of surgery.    Do not scrub wound(s), pat dry.    Do not submerge wound(s) in standing water until seen for follow up appointment (no tub bathing, swimming, or hot tubs).    Clean with mild soap, gentle washing, pat dry, cover with gauze as needed.    No oils or lotions on incisions, you can apply bacitracin ointment to your incisions.   Sutures will dissolve on their own.    Please visually inspect your wound(s) at least once daily.  If the wound(s) are in  a difficult to see location, please use a mirror or have someone else assist with visual inspection.    Signs of Infection  Signs of infection can include fever, excessive swelling, heat, drainage, redness, or severe pain at incision site. If you see any of these occur, please contact your doctor's office at [phone]. Any fever higher than 100.4, especially if associated with an ill feeling, abdominal pain, chills, or nausea should be reported to your surgeon.      Assist in bowel movements/urination  Increase fiber in diet  Urination should occur within 6 hours of anesthesia.   Increase water (6 to 8 glasses)  Increase walking   If you have tried these methods and your bladder still feels full and you cannot use the bathroom, please go to your nearest Emergency room.    Additional Instructions:   At your follow up visit we will check your incision for infection.

## 2024-03-10 ENCOUNTER — HOSPITAL ENCOUNTER (EMERGENCY)
Facility: HOSPITAL | Age: 69
Discharge: HOME | End: 2024-03-11
Attending: EMERGENCY MEDICINE
Payer: MEDICARE

## 2024-03-10 DIAGNOSIS — R10.9 ABDOMINAL PAIN, UNSPECIFIED ABDOMINAL LOCATION: ICD-10-CM

## 2024-03-10 DIAGNOSIS — R55 VASOVAGAL SYNCOPE: ICD-10-CM

## 2024-03-10 DIAGNOSIS — K59.00 CONSTIPATION, UNSPECIFIED CONSTIPATION TYPE: Primary | ICD-10-CM

## 2024-03-10 PROCEDURE — 99284 EMERGENCY DEPT VISIT MOD MDM: CPT | Performed by: EMERGENCY MEDICINE

## 2024-03-10 PROCEDURE — 99283 EMERGENCY DEPT VISIT LOW MDM: CPT

## 2024-03-10 ASSESSMENT — PAIN - FUNCTIONAL ASSESSMENT: PAIN_FUNCTIONAL_ASSESSMENT: 0-10

## 2024-03-10 ASSESSMENT — PAIN DESCRIPTION - DESCRIPTORS: DESCRIPTORS: CRAMPING

## 2024-03-10 ASSESSMENT — PAIN DESCRIPTION - PAIN TYPE: TYPE: ACUTE PAIN

## 2024-03-10 ASSESSMENT — PAIN DESCRIPTION - LOCATION: LOCATION: ABDOMEN

## 2024-03-10 ASSESSMENT — PAIN DESCRIPTION - ORIENTATION: ORIENTATION: MID;LOWER

## 2024-03-10 ASSESSMENT — COLUMBIA-SUICIDE SEVERITY RATING SCALE - C-SSRS
2. HAVE YOU ACTUALLY HAD ANY THOUGHTS OF KILLING YOURSELF?: NO
6. HAVE YOU EVER DONE ANYTHING, STARTED TO DO ANYTHING, OR PREPARED TO DO ANYTHING TO END YOUR LIFE?: NO
1. IN THE PAST MONTH, HAVE YOU WISHED YOU WERE DEAD OR WISHED YOU COULD GO TO SLEEP AND NOT WAKE UP?: NO

## 2024-03-10 ASSESSMENT — PAIN SCALES - GENERAL: PAINLEVEL_OUTOF10: 5 - MODERATE PAIN

## 2024-03-11 ENCOUNTER — APPOINTMENT (OUTPATIENT)
Dept: CARDIOLOGY | Facility: HOSPITAL | Age: 69
End: 2024-03-11
Payer: MEDICARE

## 2024-03-11 ENCOUNTER — APPOINTMENT (OUTPATIENT)
Dept: UROLOGY | Facility: CLINIC | Age: 69
End: 2024-03-11
Payer: MEDICARE

## 2024-03-11 ENCOUNTER — APPOINTMENT (OUTPATIENT)
Dept: OTOLARYNGOLOGY | Facility: HOSPITAL | Age: 69
End: 2024-03-11
Payer: MEDICARE

## 2024-03-11 ENCOUNTER — APPOINTMENT (OUTPATIENT)
Dept: RADIOLOGY | Facility: HOSPITAL | Age: 69
End: 2024-03-11
Payer: MEDICARE

## 2024-03-11 VITALS
TEMPERATURE: 97.2 F | BODY MASS INDEX: 29.66 KG/M2 | SYSTOLIC BLOOD PRESSURE: 139 MMHG | HEIGHT: 65 IN | DIASTOLIC BLOOD PRESSURE: 74 MMHG | WEIGHT: 178 LBS | HEART RATE: 88 BPM | RESPIRATION RATE: 18 BRPM | OXYGEN SATURATION: 99 %

## 2024-03-11 LAB
ALBUMIN SERPL BCP-MCNC: 4.5 G/DL (ref 3.4–5)
ALP SERPL-CCNC: 49 U/L (ref 33–136)
ALT SERPL W P-5'-P-CCNC: 16 U/L (ref 10–52)
ANION GAP SERPL CALC-SCNC: 13 MMOL/L (ref 10–20)
AST SERPL W P-5'-P-CCNC: 17 U/L (ref 9–39)
BASOPHILS # BLD AUTO: 0.03 X10*3/UL (ref 0–0.1)
BASOPHILS NFR BLD AUTO: 0.4 %
BILIRUB SERPL-MCNC: 0.5 MG/DL (ref 0–1.2)
BUN SERPL-MCNC: 13 MG/DL (ref 6–23)
CALCIUM SERPL-MCNC: 9.7 MG/DL (ref 8.6–10.3)
CARDIAC TROPONIN I PNL SERPL HS: 3 NG/L (ref 0–20)
CARDIAC TROPONIN I PNL SERPL HS: 3 NG/L (ref 0–20)
CHLORIDE SERPL-SCNC: 98 MMOL/L (ref 98–107)
CO2 SERPL-SCNC: 30 MMOL/L (ref 21–32)
CREAT SERPL-MCNC: 0.92 MG/DL (ref 0.5–1.3)
EGFRCR SERPLBLD CKD-EPI 2021: >90 ML/MIN/1.73M*2
EOSINOPHIL # BLD AUTO: 0.17 X10*3/UL (ref 0–0.7)
EOSINOPHIL NFR BLD AUTO: 2.1 %
ERYTHROCYTE [DISTWIDTH] IN BLOOD BY AUTOMATED COUNT: 11.2 % (ref 11.5–14.5)
GLUCOSE SERPL-MCNC: 115 MG/DL (ref 74–99)
HCT VFR BLD AUTO: 46.1 % (ref 41–52)
HGB BLD-MCNC: 15.4 G/DL (ref 13.5–17.5)
IMM GRANULOCYTES # BLD AUTO: 0.04 X10*3/UL (ref 0–0.7)
IMM GRANULOCYTES NFR BLD AUTO: 0.5 % (ref 0–0.9)
INR PPP: 0.9 (ref 0.9–1.1)
LYMPHOCYTES # BLD AUTO: 2.95 X10*3/UL (ref 1.2–4.8)
LYMPHOCYTES NFR BLD AUTO: 36.3 %
MAGNESIUM SERPL-MCNC: 2.29 MG/DL (ref 1.6–2.4)
MCH RBC QN AUTO: 32.4 PG (ref 26–34)
MCHC RBC AUTO-ENTMCNC: 33.4 G/DL (ref 32–36)
MCV RBC AUTO: 97 FL (ref 80–100)
MONOCYTES # BLD AUTO: 1.02 X10*3/UL (ref 0.1–1)
MONOCYTES NFR BLD AUTO: 12.6 %
NEUTROPHILS # BLD AUTO: 3.91 X10*3/UL (ref 1.2–7.7)
NEUTROPHILS NFR BLD AUTO: 48.1 %
NRBC BLD-RTO: 0 /100 WBCS (ref 0–0)
PLATELET # BLD AUTO: 326 X10*3/UL (ref 150–450)
POTASSIUM SERPL-SCNC: 3.2 MMOL/L (ref 3.5–5.3)
PROT SERPL-MCNC: 7 G/DL (ref 6.4–8.2)
PROTHROMBIN TIME: 10.4 SECONDS (ref 9.8–12.8)
RBC # BLD AUTO: 4.76 X10*6/UL (ref 4.5–5.9)
SODIUM SERPL-SCNC: 138 MMOL/L (ref 136–145)
WBC # BLD AUTO: 8.1 X10*3/UL (ref 4.4–11.3)

## 2024-03-11 PROCEDURE — 74018 RADEX ABDOMEN 1 VIEW: CPT | Performed by: RADIOLOGY

## 2024-03-11 PROCEDURE — 85610 PROTHROMBIN TIME: CPT | Performed by: STUDENT IN AN ORGANIZED HEALTH CARE EDUCATION/TRAINING PROGRAM

## 2024-03-11 PROCEDURE — 84484 ASSAY OF TROPONIN QUANT: CPT | Performed by: STUDENT IN AN ORGANIZED HEALTH CARE EDUCATION/TRAINING PROGRAM

## 2024-03-11 PROCEDURE — 71045 X-RAY EXAM CHEST 1 VIEW: CPT

## 2024-03-11 PROCEDURE — 36415 COLL VENOUS BLD VENIPUNCTURE: CPT | Performed by: STUDENT IN AN ORGANIZED HEALTH CARE EDUCATION/TRAINING PROGRAM

## 2024-03-11 PROCEDURE — 83735 ASSAY OF MAGNESIUM: CPT | Performed by: STUDENT IN AN ORGANIZED HEALTH CARE EDUCATION/TRAINING PROGRAM

## 2024-03-11 PROCEDURE — 71045 X-RAY EXAM CHEST 1 VIEW: CPT | Performed by: RADIOLOGY

## 2024-03-11 PROCEDURE — 80053 COMPREHEN METABOLIC PANEL: CPT | Performed by: STUDENT IN AN ORGANIZED HEALTH CARE EDUCATION/TRAINING PROGRAM

## 2024-03-11 PROCEDURE — 93005 ELECTROCARDIOGRAM TRACING: CPT

## 2024-03-11 PROCEDURE — 85025 COMPLETE CBC W/AUTO DIFF WBC: CPT | Performed by: STUDENT IN AN ORGANIZED HEALTH CARE EDUCATION/TRAINING PROGRAM

## 2024-03-11 PROCEDURE — 2500000001 HC RX 250 WO HCPCS SELF ADMINISTERED DRUGS (ALT 637 FOR MEDICARE OP): Performed by: STUDENT IN AN ORGANIZED HEALTH CARE EDUCATION/TRAINING PROGRAM

## 2024-03-11 PROCEDURE — 74018 RADEX ABDOMEN 1 VIEW: CPT

## 2024-03-11 RX ORDER — POTASSIUM CHLORIDE 1.5 G/1.58G
40 POWDER, FOR SOLUTION ORAL ONCE
Status: COMPLETED | OUTPATIENT
Start: 2024-03-11 | End: 2024-03-11

## 2024-03-11 RX ORDER — DICYCLOMINE HYDROCHLORIDE 10 MG/1
10 CAPSULE ORAL ONCE
Status: COMPLETED | OUTPATIENT
Start: 2024-03-11 | End: 2024-03-11

## 2024-03-11 RX ORDER — POLYETHYLENE GLYCOL 3350 17 G/17G
17 POWDER, FOR SOLUTION ORAL DAILY
Qty: 3 PACKET | Refills: 0 | Status: SHIPPED | OUTPATIENT
Start: 2024-03-11 | End: 2024-03-14

## 2024-03-11 RX ADMIN — DICYCLOMINE HYDROCHLORIDE 10 MG: 10 CAPSULE ORAL at 01:37

## 2024-03-11 RX ADMIN — POTASSIUM CHLORIDE 40 MEQ: 1.5 POWDER, FOR SOLUTION ORAL at 01:37

## 2024-03-11 ASSESSMENT — PAIN SCALES - GENERAL: PAINLEVEL_OUTOF10: 4

## 2024-03-11 NOTE — DISCHARGE INSTRUCTIONS
"You were seen and evaluated in the ER for your abdominal pain.     What is abdominal pain?  \"Abdominal pain\" means pain in the abdomen, or belly (figure 1). This is the part of the body between the chest and the pelvis.  Many things can cause abdominal pain. Some are serious things like bleeding or an infection. Less serious things, like an upset stomach, can also cause abdominal pain.  Whether you need specific treatment depends on what is causing your pain.    How do I care for myself at home?    Ask the doctor or nurse what you should do when you go home. Make sure that you understand exactly what you need to do to care for yourself. Ask questions if there is anything you do not understand.    You should also:    Keep track of your pain to help your doctor learn more about the cause. Write down what you were doing before and during the pain. If food seems to cause the pain, write down the specific foods you eat.  Eat small meals more often. Eat more fiber and drink more water if you have constipation (trouble having bowel movements).  Avoid foods or drinks that make your pain worse. Some people are bothered by:  Drinks that are fizzy or have caffeine or alcohol  Fried, greasy, or fatty foods  Orange juice  Milk or cheese  When you have pain, you can:  Try to have a bowel movement.  Lie down and rest.  Avoid solid foods for a few hours. If you are hungry, try liquids like broth or water. When you feel better, try mild foods like rice, crackers, bananas, applesauce, or toast.  Avoid over-the-counter medicines, such as antacids or laxatives, unless your doctor tells you to.  Check with your doctor before you take any herbal medicines or supplements.    What follow-up care do I need?  Follow-up with the physicians provided in your discharge paperwork.     When should I call the doctor?  Call for an ambulance or return to the ER if:  You have sudden severe belly pain, or the pain is constant.  You have trouble " breathing or chest pain.  You start vomiting blood or pass a lot of blood in your bowel movements.  Your belly becomes very hard or swollen.  You get a fever of 102.2°F (39°C) or higher or shaking chills.  You have signs of dehydration (severe fluid loss), such as:  No urine for more than 8 hours  Feeling very lightheaded, or like you are going to pass out  Feeling weak, like you are going to fall  Your pain gets worse, comes more often, or moves to 1 area of the belly.  Your stools are black or tar colored.  If your pain worsens over 48 hours  If you have any other concerns.    Call your regular doctor if:  You have a fever of 100.4°F (38°C) or higher, or chills.  You develop early signs of fluid loss, such as:  Dark-colored urine  Dry mouth  Muscle cramps  Having less energy than normal  Feeling lightheaded when you get up  You have pain when you urinate or blood in your urine.  Your bowel movements have a small amount (less than 1 teaspoon or 5 mL) of blood in them.  You have nausea that isn't getting better, and you are having trouble eating and drinking without vomiting.  You have any other symptoms that concern you.

## 2024-03-11 NOTE — ED PROVIDER NOTES
HPI   Chief Complaint   Patient presents with    Syncope       This is a 68 years old male patient who just had hydrocele repair on Friday presented to the emergency department with a chief complaint of syncope.  He was on the bedside commode trying to have a bowel movement and he syncopized.  Incident was witnessed by the wife.  He denies chest pain, shortness of breath, cough, headache, lightheadedness, dizziness, fever, chills, body aches, weakness, numbness.  Reported being constipated and having diffuse nonspecific abdominal pain.  Denies any dysuria, urgency, or frequency.    Review of system: As stated above in the HPI section.                          Cleve Coma Scale Score: 15                     Patient History   No past medical history on file.  No past surgical history on file.  No family history on file.  Social History     Tobacco Use    Smoking status: Not on file    Smokeless tobacco: Not on file   Substance Use Topics    Alcohol use: Not on file    Drug use: Not on file       Physical Exam   ED Triage Vitals   Temperature Heart Rate Respirations BP   03/10/24 2347 03/10/24 2352 03/10/24 2352 03/10/24 2352   36.2 °C (97.2 °F) 69 16 130/66      Pulse Ox Temp Source Heart Rate Source Patient Position   03/10/24 2352 03/10/24 2347 03/10/24 2352 03/10/24 2352   98 % Temporal Monitor Lying      BP Location FiO2 (%)     03/10/24 2352 --     Right arm        Physical Exam  Vitals and nursing note reviewed.   Constitutional:       General: He is not in acute distress.     Appearance: He is well-developed.   HENT:      Head: Normocephalic and atraumatic.   Eyes:      Conjunctiva/sclera: Conjunctivae normal.   Cardiovascular:      Rate and Rhythm: Normal rate and regular rhythm.      Heart sounds: No murmur heard.  Pulmonary:      Effort: Pulmonary effort is normal. No respiratory distress.      Breath sounds: Normal breath sounds.   Abdominal:      Palpations: Abdomen is soft.      Tenderness: There is no  abdominal tenderness.      Comments: Diffuse nonspecific abdominal tenderness with no guarding, rigidity, rebound.   Musculoskeletal:         General: No swelling.      Cervical back: Neck supple.   Skin:     General: Skin is warm and dry.      Capillary Refill: Capillary refill takes less than 2 seconds.   Neurological:      Mental Status: He is alert.   Psychiatric:         Mood and Affect: Mood normal.         ED Course & MDM   Diagnoses as of 03/11/24 0154   Constipation, unspecified constipation type   Vasovagal syncope   Abdominal pain, unspecified abdominal location       Medical Decision Making  Patient seen and examined, will obtain basic labs, cardiac workup.  Syncope likely secondary to vasovagal episode.  Will obtain KUB.Labs are unremarkable, patient had 5 bowel movement, discharged home  on MiraLAX and to follow-up with urology surgery team.  And to return to the emergency department if alarming symptoms arise.        Procedure  Procedures     Arvin Haas DO  03/11/24 0205

## 2024-03-12 ENCOUNTER — APPOINTMENT (OUTPATIENT)
Dept: UROLOGY | Facility: CLINIC | Age: 69
End: 2024-03-12
Payer: MEDICARE

## 2024-03-12 ASSESSMENT — PAIN SCALES - GENERAL: PAINLEVEL_OUTOF10: 0 - NO PAIN

## 2024-03-14 ENCOUNTER — OFFICE VISIT (OUTPATIENT)
Dept: UROLOGY | Facility: CLINIC | Age: 69
End: 2024-03-14
Payer: MEDICARE

## 2024-03-14 VITALS
WEIGHT: 178 LBS | HEART RATE: 74 BPM | SYSTOLIC BLOOD PRESSURE: 126 MMHG | DIASTOLIC BLOOD PRESSURE: 79 MMHG | BODY MASS INDEX: 29.62 KG/M2

## 2024-03-14 DIAGNOSIS — N43.40 SPERMATOCELE: Primary | ICD-10-CM

## 2024-03-14 PROCEDURE — 1159F MED LIST DOCD IN RCRD: CPT | Performed by: PHYSICIAN ASSISTANT

## 2024-03-14 PROCEDURE — 1126F AMNT PAIN NOTED NONE PRSNT: CPT | Performed by: PHYSICIAN ASSISTANT

## 2024-03-14 PROCEDURE — 1036F TOBACCO NON-USER: CPT | Performed by: PHYSICIAN ASSISTANT

## 2024-03-14 PROCEDURE — 99024 POSTOP FOLLOW-UP VISIT: CPT | Performed by: PHYSICIAN ASSISTANT

## 2024-03-14 ASSESSMENT — PAIN SCALES - GENERAL: PAINLEVEL: 0-NO PAIN

## 2024-03-14 NOTE — PROGRESS NOTES
Subjective   Patient ID: Anthony Clark is a 68 y.o. male who presents for Post-op.  HPI    Patient is a 68-year-old male with right spermatocele status post spermatocelectomy by Dr. Sujatha Arizmendi on 3/8/2024 presents for drain removal.    Drain output is minimal.  Patient denies any complications.  Incisions healing well without complications.  No sign of infection.    Review of Systems    Objective   Physical Exam    Assessment/Plan            Jania Garnett PA-C 03/14/24 11:07 AM

## 2024-03-14 NOTE — ASSESSMENT & PLAN NOTE
Status post spermatocelectomy Dr. Sujatha Arizmendi/8/24  Drain removed intact without any complications.  Discussed postop activity restrictions  No showers for the next 24 hours  No submerging the incision 6 weeks.  Follow-up with Dr. Sujatha Arizmendi in 4 weeks

## 2024-03-18 LAB
LABORATORY COMMENT REPORT: NORMAL
PATH REPORT.FINAL DX SPEC: NORMAL
PATH REPORT.GROSS SPEC: NORMAL
PATH REPORT.RELEVANT HX SPEC: NORMAL
PATH REPORT.TOTAL CANCER: NORMAL

## 2024-03-28 ENCOUNTER — OFFICE VISIT (OUTPATIENT)
Dept: PRIMARY CARE | Facility: CLINIC | Age: 69
End: 2024-03-28
Payer: MEDICARE

## 2024-03-28 VITALS
SYSTOLIC BLOOD PRESSURE: 118 MMHG | BODY MASS INDEX: 30.73 KG/M2 | HEART RATE: 70 BPM | OXYGEN SATURATION: 96 % | HEIGHT: 64 IN | DIASTOLIC BLOOD PRESSURE: 72 MMHG | WEIGHT: 180 LBS

## 2024-03-28 DIAGNOSIS — E55.9 VITAMIN D DEFICIENCY: ICD-10-CM

## 2024-03-28 DIAGNOSIS — E78.5 HYPERLIPIDEMIA, UNSPECIFIED HYPERLIPIDEMIA TYPE: ICD-10-CM

## 2024-03-28 DIAGNOSIS — E87.6 HYPOKALEMIA: ICD-10-CM

## 2024-03-28 DIAGNOSIS — I47.29 NSVT (NONSUSTAINED VENTRICULAR TACHYCARDIA) (MULTI): ICD-10-CM

## 2024-03-28 DIAGNOSIS — Z12.5 SCREENING FOR PROSTATE CANCER: ICD-10-CM

## 2024-03-28 DIAGNOSIS — Z00.00 ROUTINE GENERAL MEDICAL EXAMINATION AT HEALTH CARE FACILITY: Primary | ICD-10-CM

## 2024-03-28 DIAGNOSIS — R41.3 MEMORY LOSS: ICD-10-CM

## 2024-03-28 PROBLEM — N40.0 BENIGN PROSTATIC HYPERPLASIA: Status: ACTIVE | Noted: 2024-03-28

## 2024-03-28 PROBLEM — R07.9 CHEST PAIN: Status: ACTIVE | Noted: 2022-08-22

## 2024-03-28 PROBLEM — R53.83 FATIGUE: Status: ACTIVE | Noted: 2023-03-31

## 2024-03-28 PROBLEM — L29.0 PRURITUS ANI: Status: ACTIVE | Noted: 2024-03-28

## 2024-03-28 PROBLEM — K64.9 HEMORRHOIDS: Status: ACTIVE | Noted: 2023-10-26

## 2024-03-28 PROBLEM — D36.13 NEUROMA OF FOOT: Status: ACTIVE | Noted: 2024-03-28

## 2024-03-28 PROBLEM — N50.82 PAIN IN SCROTUM: Status: ACTIVE | Noted: 2024-03-28

## 2024-03-28 PROBLEM — H91.90 HEARING LOSS: Status: ACTIVE | Noted: 2024-03-28

## 2024-03-28 PROBLEM — Z86.79 HISTORY OF CARDIAC ARRHYTHMIA: Status: ACTIVE | Noted: 2024-03-28

## 2024-03-28 PROBLEM — Z86.79 HISTORY OF PERICARDITIS: Status: ACTIVE | Noted: 2024-03-28

## 2024-03-28 PROCEDURE — 1036F TOBACCO NON-USER: CPT | Performed by: INTERNAL MEDICINE

## 2024-03-28 PROCEDURE — G0439 PPPS, SUBSEQ VISIT: HCPCS | Performed by: INTERNAL MEDICINE

## 2024-03-28 PROCEDURE — 1170F FXNL STATUS ASSESSED: CPT | Performed by: INTERNAL MEDICINE

## 2024-03-28 PROCEDURE — 1123F ACP DISCUSS/DSCN MKR DOCD: CPT | Performed by: INTERNAL MEDICINE

## 2024-03-28 PROCEDURE — 1159F MED LIST DOCD IN RCRD: CPT | Performed by: INTERNAL MEDICINE

## 2024-03-28 PROCEDURE — 99397 PER PM REEVAL EST PAT 65+ YR: CPT | Performed by: INTERNAL MEDICINE

## 2024-03-28 PROCEDURE — G0444 DEPRESSION SCREEN ANNUAL: HCPCS | Performed by: INTERNAL MEDICINE

## 2024-03-28 PROCEDURE — 1160F RVW MEDS BY RX/DR IN RCRD: CPT | Performed by: INTERNAL MEDICINE

## 2024-03-28 PROCEDURE — 99213 OFFICE O/P EST LOW 20 MIN: CPT | Performed by: INTERNAL MEDICINE

## 2024-03-28 ASSESSMENT — ACTIVITIES OF DAILY LIVING (ADL)
DRESSING: INDEPENDENT
TAKING_MEDICATION: INDEPENDENT
BATHING: INDEPENDENT
DOING_HOUSEWORK: INDEPENDENT
MANAGING_FINANCES: INDEPENDENT
GROCERY_SHOPPING: INDEPENDENT

## 2024-03-28 ASSESSMENT — ENCOUNTER SYMPTOMS
WOUND: 0
ARTHRALGIAS: 1
SORE THROAT: 0
DYSURIA: 0
CONSTIPATION: 0
MYALGIAS: 0
HEADACHES: 0
FREQUENCY: 0
DIARRHEA: 0
VOMITING: 0
POLYDIPSIA: 0
ABDOMINAL PAIN: 0
BLOOD IN STOOL: 0
PALPITATIONS: 0
NERVOUS/ANXIOUS: 0
EYE PAIN: 0
FEVER: 0
CHILLS: 0
CHEST TIGHTNESS: 0
WHEEZING: 0
DYSPHORIC MOOD: 0
SHORTNESS OF BREATH: 0
COUGH: 0
DIZZINESS: 0
POLYPHAGIA: 0
RHINORRHEA: 0
HEMATURIA: 0
UNEXPECTED WEIGHT CHANGE: 0
NAUSEA: 0

## 2024-03-28 ASSESSMENT — PATIENT HEALTH QUESTIONNAIRE - PHQ9
SUM OF ALL RESPONSES TO PHQ9 QUESTIONS 1 AND 2: 0
2. FEELING DOWN, DEPRESSED OR HOPELESS: NOT AT ALL
1. LITTLE INTEREST OR PLEASURE IN DOING THINGS: NOT AT ALL

## 2024-03-28 NOTE — PROGRESS NOTES
Subjective   Reason for Visit: Anthony Clark is an 68 y.o. male here for a Medicare Wellness visit.     Past Medical, Surgical, and Family History reviewed and updated in chart.    Reviewed all medications by prescribing practitioner or clinical pharmacist (such as prescriptions, OTCs, herbal therapies and supplements) and documented in the medical record.    HPI    He had surgery 3/8 for spermatocele removal. Had severe constipation and pain. He states 3/10, he passed out. He felt off before and has had vasovagal response and has always happened with severe pain. He felt bad and called wife and she saw it happened. HE was out for short time. EMS called and went to ER    Dental visits- UTD  Eye visits- glasses, UTD    Exercise-not much now due to surgery, does elliptical 2-3 days a week, walks (10,000 steps per day)  Diet-god    Alcohol use-1-2 beers a day  Smoking-none    Had surgery and wants incision checked as part of adhesive is peeling off  Bowels moving now    Feels well    States some short term memory like forgetting what wife says. Nothing else      Patient Care Team:  Brittni Fernandez MD as PCP - General (Internal Medicine)  Brittni Fernandez MD as PCP - [employer] Medicare Advantage PCP  Brittni Fernandez MD     Review of Systems   Constitutional:  Negative for chills, fever and unexpected weight change.   HENT:  Negative for congestion, hearing loss, rhinorrhea and sore throat.    Eyes:  Negative for pain and visual disturbance.   Respiratory:  Negative for cough, chest tightness, shortness of breath and wheezing.    Cardiovascular:  Negative for chest pain and palpitations.   Gastrointestinal:  Negative for abdominal pain, blood in stool, constipation, diarrhea, nausea and vomiting.   Endocrine: Negative for cold intolerance, heat intolerance, polydipsia and polyphagia.   Genitourinary:  Negative for dysuria, frequency and hematuria.   Musculoskeletal:  Positive for arthralgias (knee pain). Negative for myalgias.  "  Skin:  Negative for rash and wound.   Neurological:  Positive for syncope. Negative for dizziness and headaches.   Psychiatric/Behavioral:  Negative for dysphoric mood. The patient is not nervous/anxious.        Objective   Vitals:  /72 (BP Location: Left arm, Patient Position: Sitting, BP Cuff Size: Large adult)   Pulse 70   Ht 1.626 m (5' 4\")   Wt 81.6 kg (180 lb)   SpO2 96%   BMI 30.90 kg/m²       Physical Exam  Vitals reviewed.   Constitutional:       Appearance: Normal appearance. He is not ill-appearing.   HENT:      Head: Normocephalic and atraumatic.      Right Ear: Tympanic membrane normal.      Left Ear: Tympanic membrane normal.      Nose: Nose normal.      Mouth/Throat:      Mouth: Mucous membranes are moist.      Pharynx: Oropharynx is clear.   Eyes:      Extraocular Movements: Extraocular movements intact.      Conjunctiva/sclera: Conjunctivae normal.      Pupils: Pupils are equal, round, and reactive to light.   Cardiovascular:      Rate and Rhythm: Normal rate and regular rhythm.   Pulmonary:      Effort: Pulmonary effort is normal.      Breath sounds: Normal breath sounds. No wheezing.   Abdominal:      General: There is no distension.      Palpations: Abdomen is soft. There is no mass.      Tenderness: There is no abdominal tenderness.   Genitourinary:     Comments: Scrotum- incision c/d/i  Musculoskeletal:         General: No swelling.      Cervical back: Neck supple.   Lymphadenopathy:      Cervical: No cervical adenopathy.   Neurological:      General: No focal deficit present.      Mental Status: He is alert and oriented to person, place, and time.      Gait: Gait normal.   Psychiatric:         Mood and Affect: Mood normal.         Behavior: Behavior normal.         Thought Content: Thought content normal.         Assessment/Plan   Problem List Items Addressed This Visit       Vitamin D deficiency - Primary    Relevant Orders    Vitamin D 25-Hydroxy,Total (for eval of Vitamin D " levels)    Hyperlipidemia    Relevant Orders    Lipid Panel     Other Visit Diagnoses       Routine general medical examination at health care facility        Screening for prostate cancer        Relevant Orders    Prostate Spec.Ag,Screen    Hypokalemia        Relevant Orders    Potassium             CPE completed.  Advised to keep a heart healthy, low fat diet with fruits and veggies like Mediterranean diet.  Advised on the importance of exercise and maintaining 150 minutes of exercise per week (30 minutes per day 5 days a week).  Advised on regular eye and dental visits.  Discussed age appropriate cancer screening, immunizations and recommendations given.  Discussed avoiding illicit drugs and tobacco. Advised on appropriate use of alcohol.  Advised to wear seat belt.    Internal hemorrhoid    Vasovagal syncope- low K-repeat, reviewed hospital discharge     Chronic bowel issues: metamucil has helped     Meniere's disease: 2010; follows with Dr. Vo, has had steroids. On HCTZ/triamterene.   -send to vestibular rehab  -followup with ENT     Vitamin D deficiency: recheck in Feb, 2000 IU daily     Hypertriglyceridemia: WNL, monitor annually     Cervical OA: used to have severe pain and terrible numbness. Had EMG and MRIs. Now much better. NO weakness. Had cortisone shot and      Left knee OA; seeing Dr. Uriarte and getting cortisone     Hx of SVT: saw cards  -neg EKG in July.  -helps if cuts out caffeine     CPE 1 year. Labs    Minicog normal. MOCA next year (fam hx of dementia)     Health Maintenance  -Prostate Cancer Screening: PSA WNL 2/21  -Vaccinations: advised on shingrix. UTD tdap, Prevnar-20 UTD UTD covid  -Lung Cancer Screening: former smoker, does not qualify  -AAA Screening: 3/22- neg  -Colonoscopy: 9/17--10 years

## 2024-03-29 ENCOUNTER — LAB (OUTPATIENT)
Dept: LAB | Facility: LAB | Age: 69
End: 2024-03-29
Payer: MEDICARE

## 2024-03-29 DIAGNOSIS — E55.9 VITAMIN D DEFICIENCY: ICD-10-CM

## 2024-03-29 DIAGNOSIS — R41.3 MEMORY LOSS: ICD-10-CM

## 2024-03-29 DIAGNOSIS — Z12.5 SCREENING FOR PROSTATE CANCER: ICD-10-CM

## 2024-03-29 DIAGNOSIS — E87.6 HYPOKALEMIA: ICD-10-CM

## 2024-03-29 DIAGNOSIS — E78.5 HYPERLIPIDEMIA, UNSPECIFIED HYPERLIPIDEMIA TYPE: ICD-10-CM

## 2024-03-29 LAB
25(OH)D3 SERPL-MCNC: 27 NG/ML (ref 30–100)
CHOLEST SERPL-MCNC: 191 MG/DL (ref 0–199)
CHOLESTEROL/HDL RATIO: 2.9
HDLC SERPL-MCNC: 65.8 MG/DL
LDLC SERPL CALC-MCNC: 106 MG/DL
NON HDL CHOLESTEROL: 125 MG/DL (ref 0–149)
POTASSIUM SERPL-SCNC: 4.3 MMOL/L (ref 3.5–5.3)
PSA SERPL-MCNC: 3.18 NG/ML
TRIGL SERPL-MCNC: 94 MG/DL (ref 0–149)
TSH SERPL-ACNC: 2.41 MIU/L (ref 0.44–3.98)
VIT B12 SERPL-MCNC: 267 PG/ML (ref 211–911)
VLDL: 19 MG/DL (ref 0–40)

## 2024-03-29 PROCEDURE — 84443 ASSAY THYROID STIM HORMONE: CPT

## 2024-03-29 PROCEDURE — 82306 VITAMIN D 25 HYDROXY: CPT

## 2024-03-29 PROCEDURE — G0103 PSA SCREENING: HCPCS

## 2024-03-29 PROCEDURE — 84132 ASSAY OF SERUM POTASSIUM: CPT

## 2024-03-29 PROCEDURE — 36415 COLL VENOUS BLD VENIPUNCTURE: CPT

## 2024-03-29 PROCEDURE — 80061 LIPID PANEL: CPT

## 2024-03-29 PROCEDURE — 82607 VITAMIN B-12: CPT

## 2024-04-06 LAB
ATRIAL RATE: 70 BPM
P AXIS: 52 DEGREES
P OFFSET: 179 MS
P ONSET: 124 MS
PR INTERVAL: 186 MS
Q ONSET: 217 MS
QRS COUNT: 12 BEATS
QRS DURATION: 98 MS
QT INTERVAL: 390 MS
QTC CALCULATION(BAZETT): 421 MS
QTC FREDERICIA: 410 MS
R AXIS: -36 DEGREES
T AXIS: -10 DEGREES
T OFFSET: 412 MS
VENTRICULAR RATE: 70 BPM

## 2024-04-16 ENCOUNTER — OFFICE VISIT (OUTPATIENT)
Dept: ORTHOPEDIC SURGERY | Facility: CLINIC | Age: 69
End: 2024-04-16
Payer: MEDICARE

## 2024-04-16 DIAGNOSIS — M17.10 ARTHRITIS OF KNEE: Primary | ICD-10-CM

## 2024-04-16 PROCEDURE — 20610 DRAIN/INJ JOINT/BURSA W/O US: CPT | Performed by: ORTHOPAEDIC SURGERY

## 2024-04-16 PROCEDURE — 1160F RVW MEDS BY RX/DR IN RCRD: CPT | Performed by: ORTHOPAEDIC SURGERY

## 2024-04-16 PROCEDURE — 1036F TOBACCO NON-USER: CPT | Performed by: ORTHOPAEDIC SURGERY

## 2024-04-16 PROCEDURE — 1157F ADVNC CARE PLAN IN RCRD: CPT | Performed by: ORTHOPAEDIC SURGERY

## 2024-04-16 PROCEDURE — 1159F MED LIST DOCD IN RCRD: CPT | Performed by: ORTHOPAEDIC SURGERY

## 2024-04-16 PROCEDURE — 2500000005 HC RX 250 GENERAL PHARMACY W/O HCPCS: Performed by: ORTHOPAEDIC SURGERY

## 2024-04-16 PROCEDURE — 1123F ACP DISCUSS/DSCN MKR DOCD: CPT | Performed by: ORTHOPAEDIC SURGERY

## 2024-04-16 PROCEDURE — 2500000004 HC RX 250 GENERAL PHARMACY W/ HCPCS (ALT 636 FOR OP/ED): Performed by: ORTHOPAEDIC SURGERY

## 2024-04-16 RX ORDER — LIDOCAINE HYDROCHLORIDE 10 MG/ML
2 INJECTION INFILTRATION; PERINEURAL
Status: COMPLETED | OUTPATIENT
Start: 2024-04-16 | End: 2024-04-16

## 2024-04-16 RX ORDER — TRIAMCINOLONE ACETONIDE 40 MG/ML
40 INJECTION, SUSPENSION INTRA-ARTICULAR; INTRAMUSCULAR
Status: COMPLETED | OUTPATIENT
Start: 2024-04-16 | End: 2024-04-16

## 2024-04-16 RX ADMIN — LIDOCAINE HYDROCHLORIDE 2 ML: 10 INJECTION, SOLUTION INFILTRATION; PERINEURAL at 11:30

## 2024-04-16 RX ADMIN — TRIAMCINOLONE ACETONIDE 40 MG: 40 INJECTION, SUSPENSION INTRA-ARTICULAR; INTRAMUSCULAR at 11:30

## 2024-04-16 NOTE — PROGRESS NOTES
History of Present Illness:  Patient returns today for an injection with corticosteroid. The patient endorsing knee pain refractory to activity modifications and oral medications.    Physical Examination:  Trace effusion  Tenderness over medial and lateral joint line    Assessment:  Patient with known osteoarthritis of the knee    Plan:  Corticosteroid injection provided, patient tolerated it well. See procedure note.    Injection performed as detailed in the procedure note below.       L Inj/Asp: L knee on 4/16/2024 11:30 AM  Indications: pain  Details: 22 G needle, anteromedial approach  Medications: 2 mL lidocaine 10 mg/mL (1 %); 40 mg triamcinolone acetonide 40 mg/mL  Outcome: tolerated well, no immediate complications  Procedure, treatment alternatives, risks and benefits explained, specific risks discussed. Consent was given by the patient. Immediately prior to procedure a time out was called to verify the correct patient, procedure, equipment, support staff and site/side marked as required. Patient was prepped and draped in the usual sterile fashion.

## 2024-05-09 ENCOUNTER — APPOINTMENT (OUTPATIENT)
Dept: UROLOGY | Facility: CLINIC | Age: 69
End: 2024-05-09
Payer: MEDICARE

## 2024-05-14 NOTE — PROGRESS NOTES
[facility]  Outpatient Clinic Note    Subjective   Anthony Clark is a 68 y.o. male    History of Present Illness   Patient presenting to clinic today for FUV.   History of right hydrocelectomy in 2017 with Dr. Davidson, epididymitis and right Spermatocele s/p spermatocelectomy by Dr. Arizmendi 03/08/2024. He has been doing well since surgery. He does note some weak urinary stream in the mornings that improves throughout the day. No scrotal swelling or pain.   Patient denies gross hematuria, dysuria, frequency, fevers, n/v, or flank pain.     Past Medical History and Surgical History   Past Medical History:   Diagnosis Date    Arrhythmia     nonsustained VT- had palpitations - last 2022    Arthritis     Elevated prostate specific antigen (PSA) 02/23/2017    Increasing prostate specific antigen level    HL (hearing loss)     Lumbar disc disease     Meniere's disease     takes dyazide for this    Osteoarthritis of knee, unspecified 01/06/2022    Osteoarthritis of knee    Personal history of other diseases of male genital organs 03/05/2018    History of orchitis    Personal history of other diseases of the circulatory system     History of pericarditis- 2004    Personal history of other diseases of the circulatory system     Personal history of cardiac arrhythmia    Personal history of other diseases of the musculoskeletal system and connective tissue     History of arthritis    Personal history of other specified conditions     History of chest pain - 2022    Radiculopathy, cervical region 02/27/2018    Cervical radiculopathy    Spermatocele      Past Surgical History:   Procedure Laterality Date    HERNIA REPAIR  06/12/2014    Hernia Repair - umbilical    OTHER SURGICAL HISTORY  02/08/2019    Hydrocele repair    SPERMATOCELECTOMY  03/2024    TONSILLECTOMY  06/12/2014    Tonsillectomy       Medications  Current Outpatient Medications on File Prior to Visit   Medication Sig Dispense Refill     cholecalciferol (Vitamin D-3) 50 MCG (2000 UT) tablet Take 1 tablet (2,000 Units) by mouth once daily.      MAGNESIUM GLYCINATE ORAL Take 200 mg by mouth once daily.      psyllium (Metamucil, sugar,) powder Take 1 Dose (5.8 g) by mouth once daily.      triamterene-hydrochlorothiazid (Maxzide-25mg) 37.5-25 mg tablet Take 0.5 tablets by mouth once daily. 45 tablet 0     No current facility-administered medications on file prior to visit.       Objective   Physicial Exam  General: Well developed, well nourished, alert and cooperative, appears in no acute distress  Eyes: Non-injected conjunctiva, sclera clear, no proptosis  Cardiac: Extremities are warm and well perfused. No edema, cyanosis or pallor.   Lungs: Breathing is easy, non-labored. Speaking in clear and complete sentences. Normal diaphragmatic movement.  MSK: Ambulatory with steady gait, unassisted  Neuro: alert and oriented to person, place and time  Psych: Demonstrates good judgement and reason, without hallucinations, abnormal affect or abnormal behaviors.  Skin: no obvious lesions, no rashes.    Review of Systems  All other systems have been reviewed and are negative for complaint.    Assessment and Plan   Patient will follow up as needed.   All questions and concerns were addressed. Patient verbalizes understanding and has no other questions at this time. You are able to have email access to your chart. You can sign into [employer] or add the Follow My Health kelly on your smart phone to review today's visit, laboratory work and imaging.   If you have any questions about your care, do not hesitate to call and leave a message, we return calls in a timely manner.    Merly Keating-- CHRIS MINOR  Office Phone:  [phone]

## 2024-05-15 ENCOUNTER — OFFICE VISIT (OUTPATIENT)
Dept: UROLOGY | Facility: HOSPITAL | Age: 69
End: 2024-05-15
Payer: MEDICARE

## 2024-05-15 DIAGNOSIS — N43.3 HYDROCELE, UNSPECIFIED HYDROCELE TYPE: Primary | ICD-10-CM

## 2024-05-15 DIAGNOSIS — N40.0 BENIGN PROSTATIC HYPERPLASIA, UNSPECIFIED WHETHER LOWER URINARY TRACT SYMPTOMS PRESENT: ICD-10-CM

## 2024-05-15 DIAGNOSIS — N43.40 SPERMATOCELE: ICD-10-CM

## 2024-05-15 LAB
POC APPEARANCE, URINE: CLEAR
POC BILIRUBIN, URINE: NEGATIVE
POC BLOOD, URINE: NEGATIVE
POC COLOR, URINE: YELLOW
POC GLUCOSE, URINE: NEGATIVE MG/DL
POC KETONES, URINE: NEGATIVE MG/DL
POC LEUKOCYTES, URINE: NEGATIVE
POC NITRITE,URINE: NEGATIVE
POC PH, URINE: 6 PH
POC PROTEIN, URINE: NEGATIVE MG/DL
POC SPECIFIC GRAVITY, URINE: 1.02
POC UROBILINOGEN, URINE: 0.2 EU/DL

## 2024-05-15 PROCEDURE — 1160F RVW MEDS BY RX/DR IN RCRD: CPT | Performed by: NURSE PRACTITIONER

## 2024-05-15 PROCEDURE — 51798 US URINE CAPACITY MEASURE: CPT | Performed by: NURSE PRACTITIONER

## 2024-05-15 PROCEDURE — 1123F ACP DISCUSS/DSCN MKR DOCD: CPT | Performed by: NURSE PRACTITIONER

## 2024-05-15 PROCEDURE — 81003 URINALYSIS AUTO W/O SCOPE: CPT | Performed by: NURSE PRACTITIONER

## 2024-05-15 PROCEDURE — 99024 POSTOP FOLLOW-UP VISIT: CPT | Performed by: NURSE PRACTITIONER

## 2024-05-15 PROCEDURE — 1159F MED LIST DOCD IN RCRD: CPT | Performed by: NURSE PRACTITIONER

## 2024-05-15 PROCEDURE — 1157F ADVNC CARE PLAN IN RCRD: CPT | Performed by: NURSE PRACTITIONER

## 2024-05-20 ENCOUNTER — CLINICAL SUPPORT (OUTPATIENT)
Dept: AUDIOLOGY | Facility: CLINIC | Age: 69
End: 2024-05-20
Payer: MEDICARE

## 2024-05-20 DIAGNOSIS — H90.3 BILATERAL SENSORINEURAL HEARING LOSS: Primary | ICD-10-CM

## 2024-05-20 DIAGNOSIS — H81.01 MENIERE'S DISEASE OF RIGHT EAR: ICD-10-CM

## 2024-05-20 PROCEDURE — 92557 COMPREHENSIVE HEARING TEST: CPT

## 2024-05-20 PROCEDURE — 92567 TYMPANOMETRY: CPT

## 2024-05-20 ASSESSMENT — PAIN SCALES - GENERAL: PAINLEVEL_OUTOF10: 0 - NO PAIN

## 2024-05-20 ASSESSMENT — PAIN - FUNCTIONAL ASSESSMENT: PAIN_FUNCTIONAL_ASSESSMENT: 0-10

## 2024-05-20 NOTE — PROGRESS NOTES
AUDIOLOGY ADULT AUDIOMETRIC EVALUATION      Name:  Anthony Clark   :  1955  Age:  68 y.o.  Date of Evaluation:  2024    Time: 9301-4527    IMPRESSIONS     Essentially mild to moderately-severe sensorineural hearing loss in the right ear, and essentially mild to moderate sensorineural hearing loss with reverse notch within normal limits at 2000 Hz in the left ear.  Word understanding in quiet is good in the right ear, and excellent in the left ear.  Tympanometry indicates normal middle ear pressure and tympanic membrane mobility in both ears.    Today's test results are hearing loss requiring medical/otologic and audiologic follow-up.     Amplification needs: Continue full-time use of devices, expect when activities preclude device safety.    RECOMMENDATIONS     Continue medical follow up with primary care provider and/or Ears Nose and Throat (ENT) provider as recommended.  Return for audiologic evaluation in conjunction with medical management or annually (whichever is sooner) to monitor hearing sensitivity and assess middle ear status or sooner should concerns arise.  Strive for full-time device use during waking hours, except when activities preclude device safety.  Follow up with managing audiologist for hearing aid programming and cleaning. Patient was provided with a copy of today's audiogram.  Avoid exposure to loud sounds by moving away from the noise, turning down the volume, or wearing proper hearing protection correctly.  Consider use of good communication strategies. These include but are not limited to the following: get Anthony's attention before speaking to him, close the distance between Anthony and who is speaking, limit background noise, allow Anthony access to visual cues (i.e. facial expressions/mouth movements, pictures, written instructions, etc.). When in situations where background noise cannot be avoided, position yourself so that the background noise is to your back, and you  communication partner is seated in front of you, ideally with a quiet area or wall behind them.   Consider use of tinnitus management options, which include but are not limited to the following: sound therapy (use of pleasant or calming sounds that diminish the presence of tinnitus); mindfulness, meditation, and breathing exercises; sleep hygiene; mental health evaluation and formal therapies; psychiatric management of psychopharmaceuticals; dental/orthodontia evaluation; dietary changes (reduction of sodium, caffeine, alcohol); lifestyle changes (exercise, etc.); use of hearing protection and avoidance of loud noise; and formal tinnitus therapies (Tinnitus Retraining Therapy/ TRT, Progressive Tinnitus Management, Tinnitus Activities Treatment, Cognitive Behavioral Therapy); and Biomedical Neuromodulation ([facility]).     HISTORY     Reason for visit:  Mr. Clark is seen today for a follow-up audiologic evaluation at the request of Saira Vo APRN.CNP due to known Meniere's disease and known asymmetric hearing loss. Previous audio was performed on 3/30/2023 and revealed mild sloping to moderately-severe sensorineural hearing loss with good word understanding (88% at 70 dB HL) for the right ear, and hearing sensitivity within normal limits at 250 and 2000 Hz, with mild sensorineural hearing loss for 500-1000 Hz, and moderate sensorineural hearing loss for 5283-1029 Hz with excellent word understanding (96% at 60 dB HL) for the left ear. History obtained from patient report and chart review.     Change in Hearing: yes in the right ear greater than the left ear  Difficult listening environments: Background noise  Tinnitus: yes in the right ear greater than the left ear constant, non-bothersome, occasionally pulsatile, and described as ringing sensation  Otalgia: yes occasionally described as Eustachian tube; none today  Aural Pressure/Fullness: yes in the right ear constantly   Recent Ear Infections/Illness:  "denied  Otorrhea: denied  Dizziness: yes, described as imbalance/unsteadiness, lasts briefly (few seconds), and occurs about once a month. Noted \"wobblieness\" with Shingrex vaccine. Denied recent vertigo.   History of Ear Surgeries: denied  History of Noise Exposure: yes, guitar play and words as a    Family History of Hearing Loss: Denied  Hearing Aid Use: Currently wears [employer] RITE hearing aids, which were fit at an outside clinic ([facility] [place]) about two years ago and were last programmed in January and were sent out for repair. Reports that he is not satisfied with his current hearing aids as they seem intermittent, but he does still notice improvement. Started wearing hearing aids in 2015  Other Significant History: denied  Falls within the last year: denied    EVALUATION     See scanned audiogram in \"Media\"     TEST RESULTS     Otoscopic Evaluation:  Right Ear: Ear canal clear with identifiable cone of light.  Left Ear: Ear canal clear with identifiable cone of light.    Tympanometry (226 Hz):  Right Ear: Type A, middle ear pressure and tympanic membrane compliance within normal limits.   Left Ear: Type A, middle ear pressure and tympanic membrane compliance within normal limits.     Acoustic Reflexes:   Right Ear: Screened at 1000 Hz, response present.   Left Ear: Screened at 1000 Hz, response present.     Distortion Product Otoacoustic Emissions:  Right Ear: Did not test.  Left Ear:  Did not test.  Present OAEs suggest normal or near cochlear outer hair cell function for corresponding frequency region(s). Absent OAEs with normal middle ear function can be consistent with some degree of hearing loss. Assessment of cochlear outer hair cell function may be impacted by outer or middle ear function.    Test technique:  Pure Tone Audiometry via insert earphones  Reliability:   good    Pure Tone Audiometry:    Right Ear: Hearing sensitivity within normal " limits at 125, sloping to mild to moderately-severe sensorineural hearing loss for 250-6000 Hz and severe at 8000 Hz.  Left Ear: Hearing sensitivity within normal limits for 125-250 Hz, sloping to moderate sensorineural hearing loss through 8000 Hz with reverse notch within normal limits at 2000 Hz.    Speech Audiometry:   Right Ear:  Speech Reception Threshold (SRT) was obtained at 50 dB HL. This is in good agreement with two frequency Pure Tone Average. Word Recognition scores were good (80%) in quiet when words were presented at 85 dB HL. These results are based on [facility] Auditory Test No.6 (NU-6) (N=25) and contralateral masking was presented at 45 dB HL.   Left Ear:  Speech Reception Threshold (SRT) was obtained at 35 dB HL. This is in good agreement with three frequency Pure Tone Average. Word Recognition scores were excellent (100%) in quiet when words were presented at 75 dB HL. These results are based on [facility] Auditory Test No.6 (NU-6) (N=25).     Comparison of today's results with previous test results: Essentially stable word understanding and overall slight decrease in thresholds since last evaluation on 3/30/2023.         PATIENT EDUCATION     Discussed results and recommendations with Mr. Clark. Questions were addressed and the patient was encouraged to contact our department at [phone] should concerns arise.     ANAHY Lazaro, CCC-A  Licensed Clinical Audiologist    Degree of   Hearing Sensitivity dB Range   Within Normal Limits (WNL) 0 - 20   Slight 25   Mild 26 - 40   Moderate 41 - 55   Moderately-Severe 56 - 70   Severe 71 - 90   Profound 91 +     Key   CHL Conductive Hearing Loss   ECV Ear Canal Volume   HA Hearing Aid   NIHL Noise-Induced Hearing Loss   PTA Pure Tone Average   SNHL Sensorineural Hearing Loss   TM Tympanic Membrane   WNL Within Normal Limits

## 2024-05-21 NOTE — PATIENT INSTRUCTIONS
IMPRESSIONS     Essentially mild to moderately-severe sensorineural hearing loss in the right ear, and essentially mild to moderate sensorineural hearing loss with reverse notch within normal limits at 2000 Hz in the left ear.  Word understanding in quiet is good in the right ear, and excellent in the left ear.  Tympanometry indicates normal middle ear pressure and tympanic membrane mobility in both ears.    Today's test results are hearing loss requiring medical/otologic and audiologic follow-up.     Amplification needs: Continue full-time use of devices, expect when activities preclude device safety.    RECOMMENDATIONS     Continue medical follow up with primary care provider and/or Ears Nose and Throat (ENT) provider as recommended.  Return for audiologic evaluation in conjunction with medical management or annually (whichever is sooner) to monitor hearing sensitivity and assess middle ear status or sooner should concerns arise.  Strive for full-time device use during waking hours, except when activities preclude device safety.  Follow up with managing audiologist for hearing aid programming and cleaning. Patient was provided with a copy of today's audiogram.  Avoid exposure to loud sounds by moving away from the noise, turning down the volume, or wearing proper hearing protection correctly.  Consider use of good communication strategies. These include but are not limited to the following: get Anthony's attention before speaking to him, close the distance between Anthony and who is speaking, limit background noise, allow Anthony access to visual cues (i.e. facial expressions/mouth movements, pictures, written instructions, etc.). When in situations where background noise cannot be avoided, position yourself so that the background noise is to your back, and you communication partner is seated in front of you, ideally with a quiet area or wall behind them.   Consider use of tinnitus management options, which include but are  not limited to the following: sound therapy (use of pleasant or calming sounds that diminish the presence of tinnitus); mindfulness, meditation, and breathing exercises; sleep hygiene; mental health evaluation and formal therapies; psychiatric management of psychopharmaceuticals; dental/orthodontia evaluation; dietary changes (reduction of sodium, caffeine, alcohol); lifestyle changes (exercise, etc.); use of hearing protection and avoidance of loud noise; and formal tinnitus therapies (Tinnitus Retraining Therapy/ TRT, Progressive Tinnitus Management, Tinnitus Activities Treatment, Cognitive Behavioral Therapy); and Biomedical Neuromodulation ([place]).

## 2024-05-24 ENCOUNTER — APPOINTMENT (OUTPATIENT)
Dept: AUDIOLOGY | Facility: CLINIC | Age: 69
End: 2024-05-24
Payer: MEDICARE

## 2024-05-24 ENCOUNTER — OFFICE VISIT (OUTPATIENT)
Dept: OTOLARYNGOLOGY | Facility: CLINIC | Age: 69
End: 2024-05-24
Payer: MEDICARE

## 2024-05-24 DIAGNOSIS — R42 DIZZINESS: ICD-10-CM

## 2024-05-24 DIAGNOSIS — H91.8X3 ASYMMETRICAL HEARING LOSS: ICD-10-CM

## 2024-05-24 DIAGNOSIS — H90.3 BILATERAL SENSORINEURAL HEARING LOSS: Primary | ICD-10-CM

## 2024-05-24 PROCEDURE — 1157F ADVNC CARE PLAN IN RCRD: CPT | Performed by: NURSE PRACTITIONER

## 2024-05-24 PROCEDURE — 1160F RVW MEDS BY RX/DR IN RCRD: CPT | Performed by: NURSE PRACTITIONER

## 2024-05-24 PROCEDURE — 1123F ACP DISCUSS/DSCN MKR DOCD: CPT | Performed by: NURSE PRACTITIONER

## 2024-05-24 PROCEDURE — 99213 OFFICE O/P EST LOW 20 MIN: CPT | Performed by: NURSE PRACTITIONER

## 2024-05-24 PROCEDURE — 1159F MED LIST DOCD IN RCRD: CPT | Performed by: NURSE PRACTITIONER

## 2024-05-24 PROCEDURE — 1036F TOBACCO NON-USER: CPT | Performed by: NURSE PRACTITIONER

## 2024-05-24 RX ORDER — LANOLIN ALCOHOL/MO/W.PET/CERES
1000 CREAM (GRAM) TOPICAL DAILY
COMMUNITY

## 2024-05-24 ASSESSMENT — PATIENT HEALTH QUESTIONNAIRE - PHQ9
2. FEELING DOWN, DEPRESSED OR HOPELESS: NOT AT ALL
SUM OF ALL RESPONSES TO PHQ9 QUESTIONS 1 AND 2: 0
1. LITTLE INTEREST OR PLEASURE IN DOING THINGS: NOT AT ALL

## 2024-05-24 NOTE — PROGRESS NOTES
"Subjective   Patient ID: Anthony Clark is a 68 y.o. male who presents for Follow-up.  HPI  This patient presents for a yearly follow-up visit.  In review, he was initially seen for sudden worsening of right hearing loss in 2018.  He was treated with oral prednisone followed by steroid injection with no improvement.  He has a longstanding history of bilateral sensorineural hearing loss and wears bilateral hearing aids with good benefit.  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  I recommended resuming dietary modifications and daily diuretic therapy.  Initially, he did not notice any improvement in his hearing and did not have any Ménière's like vertiginous episodes.  We then stopped diuretic and right hearing immediately dropped.  Patient was then restarted on diuretic therapy.  Today, he feels that his hearing is not as clear in the right ear.  He again denies any vertiginous episodes but often feels a \"floating\" sensation.  In the past this has occurred after eating higher sodium foods.  He admits to not being very diligent with [place]  He also reports drinking Gatorade frequently.  Review of Systems  A comprehensive or 10 points review of the patient´s constitutional, neurological, HEENT, pulmonary, cardiovascular and genito-urinary systems showed only those mentioned in history of present illness.    Objective   Physical Exam  Constitutional: no fever, chills, weight loss or weight gain   General appearance: Appears well, well-nourished, well groomed. No acute distress.   Communication: Normal communication   Psychiatric: Oriented to person, place and time. Normal mood and affect.   Neurologic: Cranial nerves II-XII grossly intact and symmetric bilaterally.   Head and Face:   Head: Atraumatic with no masses, lesions or scarring.   Face: Normal symmetry, no paralysis, synkinesis or facial tic. No scars or deformities.     Eyes: Conjunctiva not edematous " "or erythematous   Ears: External inspection of ears with no deformity, scars or masses. Bilateral EACs clear and bilateral TMs intact with no signs of effusions     Neck: Normal appearing, symmetric, trachea midline.   Cardiovascular: Examination of peripheral vascular system shows no clubbing or cyanosis.   Respiratory: No respiratory distress increased work of breathing. Inspection of the chest with symmetric chest expansion and normal respiratory effort.   Skin: No rashes in the head or neck  My interpretation of the audiogram done 5/20/2024 is normal dipping to moderate sensorineural hearing loss bilaterally.  There is right greater than left asymmetry at 250 through 500 Hz and again at 1500 through 8000 Hz.  Word recognition scores 80% on the right and 100% on the left.  Normal tympanograms bilaterally.  When compared to his previous audiogram dated 3/30/2023, hearing thresholds are essentially stable, there have been fluctuations in 5-10 DB's in both ears in a few different frequencies.  Previous word recognition score on the right was 88%.  Assessment/Plan        This patient presents for subsequent evaluation of chronic acquired bilateral/asymmetrical sensorineural hearing loss and dizziness.    Reassurance given that otologic exam today is normal.  Audiograms were reviewed in detail.  There have been some slight changes in both ears, but nothing significant.  We discussed that he may get some improvement in his hearing/word recognition score on the right with being more diligent with his sodium intake.  He should also notice an improvement in his \"floating\" sensation with diligent dietary modifications.  We discussed doing home balance exercises versus referral for vestibular therapy.  At this time, patient would prefer home exercises.  I recommended that he continue to follow-up yearly.  If anything worsens significantly, I asked that he contact my office.  All questions were answered to patient's " satisfaction.    This note was created using speech recognition transcription software. Despite proofreading, several typographical errors might be present that might affect the meaning of the content. Please call with any questions.      KAYY Cedeño 05/24/24 12:00 PM

## 2024-06-03 ENCOUNTER — PATIENT MESSAGE (OUTPATIENT)
Dept: PRIMARY CARE | Facility: CLINIC | Age: 69
End: 2024-06-03
Payer: MEDICARE

## 2024-06-03 DIAGNOSIS — H81.09 MENIERE'S DISEASE, UNSPECIFIED LATERALITY: ICD-10-CM

## 2024-06-03 DIAGNOSIS — H40.039 ANATOMICAL NARROW ANGLE: Primary | ICD-10-CM

## 2024-06-03 RX ORDER — TRIAMTERENE/HYDROCHLOROTHIAZID 37.5-25 MG
0.5 TABLET ORAL DAILY
Qty: 45 TABLET | Refills: 3 | Status: SHIPPED | OUTPATIENT
Start: 2024-06-03 | End: 2025-06-03

## 2024-07-16 ENCOUNTER — OFFICE VISIT (OUTPATIENT)
Dept: ORTHOPEDIC SURGERY | Facility: CLINIC | Age: 69
End: 2024-07-16
Payer: MEDICARE

## 2024-07-16 DIAGNOSIS — M17.10 ARTHRITIS OF KNEE: ICD-10-CM

## 2024-07-16 PROCEDURE — 1036F TOBACCO NON-USER: CPT | Performed by: ORTHOPAEDIC SURGERY

## 2024-07-16 PROCEDURE — 1157F ADVNC CARE PLAN IN RCRD: CPT | Performed by: ORTHOPAEDIC SURGERY

## 2024-07-16 PROCEDURE — 1123F ACP DISCUSS/DSCN MKR DOCD: CPT | Performed by: ORTHOPAEDIC SURGERY

## 2024-07-16 PROCEDURE — 20610 DRAIN/INJ JOINT/BURSA W/O US: CPT | Mod: LT | Performed by: ORTHOPAEDIC SURGERY

## 2024-07-16 PROCEDURE — 99211 OFF/OP EST MAY X REQ PHY/QHP: CPT | Mod: 25 | Performed by: ORTHOPAEDIC SURGERY

## 2024-07-16 PROCEDURE — 1159F MED LIST DOCD IN RCRD: CPT | Performed by: ORTHOPAEDIC SURGERY

## 2024-07-16 PROCEDURE — 2500000005 HC RX 250 GENERAL PHARMACY W/O HCPCS: Performed by: ORTHOPAEDIC SURGERY

## 2024-07-16 PROCEDURE — 2500000004 HC RX 250 GENERAL PHARMACY W/ HCPCS (ALT 636 FOR OP/ED): Performed by: ORTHOPAEDIC SURGERY

## 2024-07-16 RX ORDER — LIDOCAINE HYDROCHLORIDE 10 MG/ML
2 INJECTION INFILTRATION; PERINEURAL
Status: COMPLETED | OUTPATIENT
Start: 2024-07-16 | End: 2024-07-16

## 2024-07-16 RX ORDER — TRIAMCINOLONE ACETONIDE 40 MG/ML
40 INJECTION, SUSPENSION INTRA-ARTICULAR; INTRAMUSCULAR
Status: COMPLETED | OUTPATIENT
Start: 2024-07-16 | End: 2024-07-16

## 2024-07-16 NOTE — PROGRESS NOTES
History of Present Illness:  Patient returns today for an injection with corticosteroid. The patient endorsing knee pain refractory to activity modifications and oral medications.    Physical Examination:  Trace effusion  Tenderness over medial and lateral joint line    Assessment:  Patient with known osteoarthritis of the knee    Plan:  Corticosteroid injection provided, patient tolerated it well. See procedure note.    Injection performed as detailed in the procedure note below.       L Inj/Asp: L knee on 7/16/2024 9:55 AM  Indications: pain  Details: 22 G needle, anteromedial approach  Medications: 2 mL lidocaine 10 mg/mL (1 %); 40 mg triamcinolone acetonide 40 mg/mL  Outcome: tolerated well, no immediate complications  Procedure, treatment alternatives, risks and benefits explained, specific risks discussed. Consent was given by the patient. Immediately prior to procedure a time out was called to verify the correct patient, procedure, equipment, support staff and site/side marked as required. Patient was prepped and draped in the usual sterile fashion.

## 2024-09-13 ENCOUNTER — TELEPHONE (OUTPATIENT)
Dept: OPHTHALMOLOGY | Facility: CLINIC | Age: 69
End: 2024-09-13
Payer: MEDICARE

## 2024-09-13 NOTE — TELEPHONE ENCOUNTER
Patient is scheduled with wrong doctor.  Called patient to reschedule with glaucoma provider.  Let voicemail with callback information to reschedule.

## 2024-09-30 ENCOUNTER — APPOINTMENT (OUTPATIENT)
Dept: OPHTHALMOLOGY | Facility: CLINIC | Age: 69
End: 2024-09-30
Payer: MEDICARE

## 2024-11-18 NOTE — PROGRESS NOTES
11/20/2024 CC: 69 y.o. presents for glaucoma evaluation.    Past ocular history: Glasses  Family history: Narrow angle in father  Past medical history: Schwannoma, SNHL, BPH  Social history: denies tobacco     Eye medications:   Both eyes: none    Allergy:  As per chart     Testing:    HVF 24-2 11/20/2024: OD- full, MD 1.9. OS- full, MD 1.9 dB    OCT 11/20/2024: OD- full, avg 93. OS- full, avg 93 um.    Pachymetry 11/20/2024:  528/537    Gonioscopy 11/20/2024: Open OU, but narrow S/T    Tmax: unknown    Assessment:   Glaucoma suspect  - Positive fhx of BILLY  - Average C  - Gonio: Open, narrower S/T  - Normal testing  - Monitor    NS cataract  - NVS    Plan:   I explained my findings. BILLY, monitor    Eye medications:   Both eyes: none    Return in 1 yr, gonioscopy

## 2024-11-19 ENCOUNTER — APPOINTMENT (OUTPATIENT)
Dept: ORTHOPEDIC SURGERY | Facility: CLINIC | Age: 69
End: 2024-11-19
Payer: MEDICARE

## 2024-11-20 ENCOUNTER — APPOINTMENT (OUTPATIENT)
Dept: OPHTHALMOLOGY | Facility: CLINIC | Age: 69
End: 2024-11-20
Payer: MEDICARE

## 2024-11-20 DIAGNOSIS — H40.003 GLAUCOMA SUSPECT OF BOTH EYES: Primary | ICD-10-CM

## 2024-11-20 PROCEDURE — 92083 EXTENDED VISUAL FIELD XM: CPT | Performed by: OPHTHALMOLOGY

## 2024-11-20 PROCEDURE — 99204 OFFICE O/P NEW MOD 45 MIN: CPT | Performed by: OPHTHALMOLOGY

## 2024-11-20 PROCEDURE — 92134 CPTRZ OPH DX IMG PST SGM RTA: CPT | Performed by: OPHTHALMOLOGY

## 2024-11-20 ASSESSMENT — TONOMETRY
OS_IOP_MMHG: 15
IOP_METHOD: TONOPEN
OS_IOP_MMHG: 12
OD_IOP_MMHG: 12
OD_IOP_MMHG: 12
IOP_METHOD: GOLDMANN APPLANATION

## 2024-11-20 ASSESSMENT — GONIOSCOPY
OD_TEMPORAL: ATM
OD_INFERIOR: PTM
OS_SUPERIOR: ATM
OD_SUPERIOR: ATM
OD_NASAL: PTM
OS_TEMPORAL: ATM
OS_NASAL: ATM
OS_INFERIOR: PTM

## 2024-11-20 ASSESSMENT — PACHYMETRY
OD_CT(UM): 528
OS_CT(UM): 537
EXAM_DATE: 11/20/2024

## 2024-11-20 ASSESSMENT — VISUAL ACUITY
OD_SC: 20/60
OD_SC+: -1
OS_SC: 20/60
OD_CC+: -2
METHOD: SNELLEN - LINEAR
OS_CC: 20/20
OS_CC+: -1
OD_CC: 20/20
CORRECTION_TYPE: GLASSES

## 2024-11-20 ASSESSMENT — CONF VISUAL FIELD
METHOD: COUNTING FINGERS
OS_SUPERIOR_NASAL_RESTRICTION: 0
OD_SUPERIOR_NASAL_RESTRICTION: 0
OD_SUPERIOR_TEMPORAL_RESTRICTION: 0
OD_NORMAL: 1
OS_INFERIOR_NASAL_RESTRICTION: 0
OD_INFERIOR_NASAL_RESTRICTION: 0
OD_INFERIOR_TEMPORAL_RESTRICTION: 0
OS_NORMAL: 1
OS_SUPERIOR_TEMPORAL_RESTRICTION: 0
OS_INFERIOR_TEMPORAL_RESTRICTION: 0

## 2024-11-20 ASSESSMENT — REFRACTION_WEARINGRX
OS_SPHERE: +1.50
OS_CYLINDER: -0.25
OD_SPHERE: +2.00
OD_AXIS: 149
OS_ADD: +2.75
OD_CYLINDER: -0.50
OS_AXIS: 066
SPECS_TYPE: PAL
OD_ADD: +2.75

## 2024-11-20 ASSESSMENT — SLIT LAMP EXAM - LIDS
COMMENTS: NORMAL
COMMENTS: NORMAL

## 2024-11-20 ASSESSMENT — CUP TO DISC RATIO
OD_RATIO: 0.3
OS_RATIO: 0.3

## 2024-11-20 ASSESSMENT — EXTERNAL EXAM - LEFT EYE: OS_EXAM: NORMAL

## 2024-11-20 ASSESSMENT — ENCOUNTER SYMPTOMS: EYES NEGATIVE: 1

## 2024-11-20 ASSESSMENT — EXTERNAL EXAM - RIGHT EYE: OD_EXAM: NORMAL

## 2024-12-09 ENCOUNTER — HOSPITAL ENCOUNTER (OUTPATIENT)
Dept: RADIOLOGY | Facility: CLINIC | Age: 69
Discharge: HOME | End: 2024-12-09
Payer: MEDICARE

## 2024-12-09 ENCOUNTER — OFFICE VISIT (OUTPATIENT)
Dept: ORTHOPEDIC SURGERY | Facility: CLINIC | Age: 69
End: 2024-12-09
Payer: MEDICARE

## 2024-12-09 DIAGNOSIS — M17.10 ARTHRITIS OF KNEE: ICD-10-CM

## 2024-12-09 PROCEDURE — 99213 OFFICE O/P EST LOW 20 MIN: CPT | Performed by: ORTHOPAEDIC SURGERY

## 2024-12-09 PROCEDURE — 1159F MED LIST DOCD IN RCRD: CPT | Performed by: ORTHOPAEDIC SURGERY

## 2024-12-09 PROCEDURE — 2500000004 HC RX 250 GENERAL PHARMACY W/ HCPCS (ALT 636 FOR OP/ED): Performed by: ORTHOPAEDIC SURGERY

## 2024-12-09 PROCEDURE — 73564 X-RAY EXAM KNEE 4 OR MORE: CPT | Mod: RIGHT SIDE | Performed by: RADIOLOGY

## 2024-12-09 PROCEDURE — 1157F ADVNC CARE PLAN IN RCRD: CPT | Performed by: ORTHOPAEDIC SURGERY

## 2024-12-09 PROCEDURE — 20610 DRAIN/INJ JOINT/BURSA W/O US: CPT | Mod: LT | Performed by: ORTHOPAEDIC SURGERY

## 2024-12-09 PROCEDURE — 1036F TOBACCO NON-USER: CPT | Performed by: ORTHOPAEDIC SURGERY

## 2024-12-09 PROCEDURE — 1123F ACP DISCUSS/DSCN MKR DOCD: CPT | Performed by: ORTHOPAEDIC SURGERY

## 2024-12-09 PROCEDURE — 73564 X-RAY EXAM KNEE 4 OR MORE: CPT | Mod: RT

## 2024-12-09 RX ORDER — LIDOCAINE HYDROCHLORIDE 10 MG/ML
2 INJECTION, SOLUTION INFILTRATION; PERINEURAL
Status: COMPLETED | OUTPATIENT
Start: 2024-12-09 | End: 2024-12-09

## 2024-12-09 RX ORDER — TRIAMCINOLONE ACETONIDE 40 MG/ML
40 INJECTION, SUSPENSION INTRA-ARTICULAR; INTRAMUSCULAR
Status: COMPLETED | OUTPATIENT
Start: 2024-12-09 | End: 2024-12-09

## 2024-12-09 NOTE — PROGRESS NOTES
History of Present Illness:  Patient with known osteoarthritis of the knee who presents today for repeat evaluation.  The patient notes persistent pain as well as some occasional mechanical symptoms.  The patient has tried the following modalities: Rest, ice, anti-inflammatories as well as multiple corticosteroid injections in the past.    He understands he is trending towards total knee arthroplasty but is waiting until the spring 2025 to address this.  He would like repeat injections today.    Review of Systems:   GENERAL: Negative for malaise, significant weight loss, fever  MUSCULOSKELETAL: see HPI  NEURO:  Negative    Physical Examination:  Left Knee:  Skin healthy and intact  No gross swelling or ecchymosis  Alignment: Significant varus  Effusion: Trace  ROM: 0-1 20   Crepitance with range of motion  No pain with internal rotation of the hip  Tenderness to palpation: over medial and lateral joint line and with patellar compression     No laxity to valgus stress  No laxity to varus stress  Negative Lachman´s test  Negative posterior drawer test  Mild pain with Tawana´s test    Neurovascular exam normal distally  2+ DP pulse and good cap refill    Imaging:  Reviewed, degenerative joint disease noted severe tricompartmental with varus angulation left knee, right knee moderate to severe arthritis    Assessment:  Patient with known osteoarthritis of the left knee    Plan:  We reviewed an evidence-based approach to OA of the knee.  We discussed past treatments as well options for future treatment.  We discussed NSAIDS (risks and benefits), low impact activities.   We discussed repeat corticosteroid injection the left knee today, x-rays of the right knee out the door, as well as further discussion regarding total knee arthroplasty in the spring.     Vj Goldberg PA-C     In a face to face encounter, I evaluated the patient and performed a physical examination, discussed pertinent diagnostic studies if  indicated and discussed diagnosis and management strategies with both the patient and physician assistant / nurse practitioner.  I reviewed the PA/NP's note and agree with the documented findings and plan of care.    We discussed his contralateral knee the right side is not quite as arthritic but there certainly are some changes.  Corticosteroid injection provided today discussed the possibility of arthroplasty next year    MD GLENN Glover Inj/Asp: L knee on 12/9/2024 9:58 AM  Indications: pain  Details: 22 G needle, anteromedial approach  Medications: 2 mL lidocaine 10 mg/mL (1 %); 40 mg triamcinolone acetonide 40 mg/mL  Outcome: tolerated well, no immediate complications  Procedure, treatment alternatives, risks and benefits explained, specific risks discussed. Consent was given by the patient. Immediately prior to procedure a time out was called to verify the correct patient, procedure, equipment, support staff and site/side marked as required. Patient was prepped and draped in the usual sterile fashion.

## 2025-02-18 ENCOUNTER — APPOINTMENT (OUTPATIENT)
Dept: PRIMARY CARE | Facility: CLINIC | Age: 70
End: 2025-02-18
Payer: MEDICARE

## 2025-02-18 VITALS
HEART RATE: 72 BPM | OXYGEN SATURATION: 97 % | SYSTOLIC BLOOD PRESSURE: 128 MMHG | BODY MASS INDEX: 31.92 KG/M2 | HEIGHT: 64 IN | WEIGHT: 187 LBS | DIASTOLIC BLOOD PRESSURE: 84 MMHG

## 2025-02-18 DIAGNOSIS — E53.8 B12 DEFICIENCY: ICD-10-CM

## 2025-02-18 DIAGNOSIS — Z00.00 ROUTINE GENERAL MEDICAL EXAMINATION AT HEALTH CARE FACILITY: Primary | ICD-10-CM

## 2025-02-18 DIAGNOSIS — I47.29 NSVT (NONSUSTAINED VENTRICULAR TACHYCARDIA) (MULTI): ICD-10-CM

## 2025-02-18 DIAGNOSIS — Z12.5 SCREENING FOR PROSTATE CANCER: ICD-10-CM

## 2025-02-18 DIAGNOSIS — E55.9 VITAMIN D DEFICIENCY: ICD-10-CM

## 2025-02-18 DIAGNOSIS — E78.5 HYPERLIPIDEMIA, UNSPECIFIED HYPERLIPIDEMIA TYPE: ICD-10-CM

## 2025-02-18 PROCEDURE — 99397 PER PM REEVAL EST PAT 65+ YR: CPT | Performed by: INTERNAL MEDICINE

## 2025-02-18 PROCEDURE — 1159F MED LIST DOCD IN RCRD: CPT | Performed by: INTERNAL MEDICINE

## 2025-02-18 PROCEDURE — 1170F FXNL STATUS ASSESSED: CPT | Performed by: INTERNAL MEDICINE

## 2025-02-18 PROCEDURE — 3008F BODY MASS INDEX DOCD: CPT | Performed by: INTERNAL MEDICINE

## 2025-02-18 PROCEDURE — 1157F ADVNC CARE PLAN IN RCRD: CPT | Performed by: INTERNAL MEDICINE

## 2025-02-18 PROCEDURE — G0439 PPPS, SUBSEQ VISIT: HCPCS | Performed by: INTERNAL MEDICINE

## 2025-02-18 PROCEDURE — 1160F RVW MEDS BY RX/DR IN RCRD: CPT | Performed by: INTERNAL MEDICINE

## 2025-02-18 PROCEDURE — 1036F TOBACCO NON-USER: CPT | Performed by: INTERNAL MEDICINE

## 2025-02-18 PROCEDURE — 99213 OFFICE O/P EST LOW 20 MIN: CPT | Performed by: INTERNAL MEDICINE

## 2025-02-18 PROCEDURE — 1123F ACP DISCUSS/DSCN MKR DOCD: CPT | Performed by: INTERNAL MEDICINE

## 2025-02-18 ASSESSMENT — ENCOUNTER SYMPTOMS
VOMITING: 0
EYE PAIN: 0
SHORTNESS OF BREATH: 0
POLYPHAGIA: 0
HEMATURIA: 0
WHEEZING: 0
NERVOUS/ANXIOUS: 0
CONSTIPATION: 0
FREQUENCY: 0
WOUND: 0
DYSURIA: 0
DIZZINESS: 0
POLYDIPSIA: 0
BLOOD IN STOOL: 0
DYSPHORIC MOOD: 0
MYALGIAS: 0
HEADACHES: 0
PALPITATIONS: 0
CHILLS: 0
CHEST TIGHTNESS: 0
NAUSEA: 0
ABDOMINAL PAIN: 0
UNEXPECTED WEIGHT CHANGE: 0
FEVER: 0
DIARRHEA: 0
COUGH: 0
SORE THROAT: 0
ARTHRALGIAS: 0
RHINORRHEA: 0

## 2025-02-18 ASSESSMENT — ACTIVITIES OF DAILY LIVING (ADL)
DRESSING: INDEPENDENT
MANAGING_FINANCES: INDEPENDENT
DOING_HOUSEWORK: INDEPENDENT
GROCERY_SHOPPING: INDEPENDENT
BATHING: INDEPENDENT
TAKING_MEDICATION: INDEPENDENT

## 2025-02-18 ASSESSMENT — MONTREAL COGNITIVE ASSESSMENT (MOCA)
7. [VIGILENCE] TAP WHEN HEARING DESIGNATED LETTER: 1
11. FOR EACH PAIR OF WORDS, WHAT CATEGORY DO THEY BELONG TO (OUT OF 2): 2
12. MEMORY INDEX SCORE: 4
9. REPEAT EACH SENTENCE: 2
WHAT IS THE TOTAL SCORE (OUT OF 30): 29
8. SERIAL SUBTRACTION OF 7S: 3
13. ORIENTATION SUBSCORE: 6
WHAT LEVEL OF EDUCATION WAS ATTAINED: 0
VISUOSPATIAL/EXECUTIVE SUBSCORE: 5
10. [FLUENCY] NAME WORDS STARTING WITH DESIGNATED LETTER: 1
4. NAME EACH OF THE THREE ANIMALS SHOWN: 3
5. MEMORY TRIALS: 0
6. READ LIST OF DIGITS [FORWARD/BACKWARD]: 2

## 2025-02-18 NOTE — ASSESSMENT & PLAN NOTE
Orders:    Vitamin D 25-Hydroxy,Total (for eval of Vitamin D levels); Future    Vitamin D 25-Hydroxy,Total (for eval of Vitamin D levels); Future

## 2025-02-18 NOTE — ASSESSMENT & PLAN NOTE
Orders:    Lipid Panel; Future    TSH with reflex to Free T4 if abnormal; Future    Comprehensive Metabolic Panel; Future    CBC; Future    Lipid Panel; Future

## 2025-02-19 LAB
25(OH)D3+25(OH)D2 SERPL-MCNC: 44 NG/ML (ref 30–100)
ALBUMIN SERPL-MCNC: 4.6 G/DL (ref 3.6–5.1)
ALP SERPL-CCNC: 58 U/L (ref 35–144)
ALT SERPL-CCNC: 16 U/L (ref 9–46)
ANION GAP SERPL CALCULATED.4IONS-SCNC: 7 MMOL/L (CALC) (ref 7–17)
AST SERPL-CCNC: 18 U/L (ref 10–35)
BILIRUB SERPL-MCNC: 0.8 MG/DL (ref 0.2–1.2)
BUN SERPL-MCNC: 13 MG/DL (ref 7–25)
CALCIUM SERPL-MCNC: 9.6 MG/DL (ref 8.6–10.3)
CHLORIDE SERPL-SCNC: 100 MMOL/L (ref 98–110)
CHOLEST SERPL-MCNC: 205 MG/DL
CHOLEST/HDLC SERPL: 2.8 (CALC)
CO2 SERPL-SCNC: 29 MMOL/L (ref 20–32)
CREAT SERPL-MCNC: 0.97 MG/DL (ref 0.7–1.35)
EGFRCR SERPLBLD CKD-EPI 2021: 85 ML/MIN/1.73M2
ERYTHROCYTE [DISTWIDTH] IN BLOOD BY AUTOMATED COUNT: 11.6 % (ref 11–15)
GLUCOSE SERPL-MCNC: 88 MG/DL (ref 65–99)
HCT VFR BLD AUTO: 44.5 % (ref 38.5–50)
HDLC SERPL-MCNC: 74 MG/DL
HGB BLD-MCNC: 15.2 G/DL (ref 13.2–17.1)
LDLC SERPL CALC-MCNC: 110 MG/DL (CALC)
MCH RBC QN AUTO: 33.2 PG (ref 27–33)
MCHC RBC AUTO-ENTMCNC: 34.2 G/DL (ref 32–36)
MCV RBC AUTO: 97.2 FL (ref 80–100)
NONHDLC SERPL-MCNC: 131 MG/DL (CALC)
PLATELET # BLD AUTO: 317 THOUSAND/UL (ref 140–400)
PMV BLD REES-ECKER: 10.8 FL (ref 7.5–12.5)
POTASSIUM SERPL-SCNC: 5 MMOL/L (ref 3.5–5.3)
PROT SERPL-MCNC: 6.9 G/DL (ref 6.1–8.1)
PSA SERPL-MCNC: 3.26 NG/ML
RBC # BLD AUTO: 4.58 MILLION/UL (ref 4.2–5.8)
SODIUM SERPL-SCNC: 136 MMOL/L (ref 135–146)
TRIGL SERPL-MCNC: 103 MG/DL
TSH SERPL-ACNC: 2.29 MIU/L (ref 0.4–4.5)
VIT B12 SERPL-MCNC: 393 PG/ML (ref 200–1100)
WBC # BLD AUTO: 5.3 THOUSAND/UL (ref 3.8–10.8)

## 2025-04-01 ENCOUNTER — OFFICE VISIT (OUTPATIENT)
Dept: ORTHOPEDIC SURGERY | Facility: CLINIC | Age: 70
End: 2025-04-01
Payer: MEDICARE

## 2025-04-01 DIAGNOSIS — M17.10 ARTHRITIS OF KNEE: Primary | ICD-10-CM

## 2025-04-01 PROCEDURE — 1159F MED LIST DOCD IN RCRD: CPT | Performed by: ORTHOPAEDIC SURGERY

## 2025-04-01 PROCEDURE — 1036F TOBACCO NON-USER: CPT | Performed by: ORTHOPAEDIC SURGERY

## 2025-04-01 PROCEDURE — 99211 OFF/OP EST MAY X REQ PHY/QHP: CPT | Performed by: ORTHOPAEDIC SURGERY

## 2025-04-01 PROCEDURE — 1123F ACP DISCUSS/DSCN MKR DOCD: CPT | Performed by: ORTHOPAEDIC SURGERY

## 2025-04-01 PROCEDURE — 1157F ADVNC CARE PLAN IN RCRD: CPT | Performed by: ORTHOPAEDIC SURGERY

## 2025-04-01 PROCEDURE — 20610 DRAIN/INJ JOINT/BURSA W/O US: CPT | Mod: LT | Performed by: ORTHOPAEDIC SURGERY

## 2025-04-01 PROCEDURE — 2500000004 HC RX 250 GENERAL PHARMACY W/ HCPCS (ALT 636 FOR OP/ED): Performed by: ORTHOPAEDIC SURGERY

## 2025-04-01 RX ORDER — TRIAMCINOLONE ACETONIDE 40 MG/ML
40 INJECTION, SUSPENSION INTRA-ARTICULAR; INTRAMUSCULAR
Status: COMPLETED | OUTPATIENT
Start: 2025-04-01 | End: 2025-04-01

## 2025-04-01 RX ORDER — LIDOCAINE HYDROCHLORIDE 10 MG/ML
2 INJECTION, SOLUTION INFILTRATION; PERINEURAL
Status: COMPLETED | OUTPATIENT
Start: 2025-04-01 | End: 2025-04-01

## 2025-04-01 RX ADMIN — LIDOCAINE HYDROCHLORIDE 2 ML: 10 INJECTION, SOLUTION INFILTRATION; PERINEURAL at 09:15

## 2025-04-01 RX ADMIN — TRIAMCINOLONE ACETONIDE 40 MG: 40 INJECTION, SUSPENSION INTRA-ARTICULAR; INTRAMUSCULAR at 09:15

## 2025-04-01 NOTE — PROGRESS NOTES
History of Present Illness:  Patient returns today for an injection with corticosteroid. The patient endorsing knee pain refractory to activity modifications and oral medications.    Physical Examination:  Trace effusion  Tenderness over medial and lateral joint line    Assessment:  Patient with known osteoarthritis of the knee    Plan:  Corticosteroid injection provided, patient tolerated it well. See procedure note.    Injection performed as detailed in the procedure note below.       L Inj/Asp: L knee on 4/1/2025 9:15 AM  Indications: pain  Details: 22 G needle, anteromedial approach  Medications: 2 mL lidocaine 10 mg/mL (1 %); 40 mg triamcinolone acetonide 40 mg/mL  Outcome: tolerated well, no immediate complications  Procedure, treatment alternatives, risks and benefits explained, specific risks discussed. Consent was given by the patient. Immediately prior to procedure a time out was called to verify the correct patient, procedure, equipment, support staff and site/side marked as required. Patient was prepped and draped in the usual sterile fashion.

## 2025-06-17 ENCOUNTER — APPOINTMENT (OUTPATIENT)
Dept: PRIMARY CARE | Facility: CLINIC | Age: 70
End: 2025-06-17
Payer: MEDICARE

## 2025-06-17 DIAGNOSIS — H81.09 MENIERE'S DISEASE, UNSPECIFIED LATERALITY: ICD-10-CM

## 2025-06-17 RX ORDER — TRIAMTERENE AND HYDROCHLOROTHIAZIDE 37.5; 25 MG/1; MG/1
0.5 TABLET ORAL DAILY
Qty: 45 TABLET | Refills: 0 | Status: SHIPPED | OUTPATIENT
Start: 2025-06-17 | End: 2026-06-17

## 2025-06-30 ENCOUNTER — HOSPITAL ENCOUNTER (OUTPATIENT)
Dept: RADIOLOGY | Facility: HOSPITAL | Age: 70
Discharge: HOME | End: 2025-06-30
Payer: MEDICARE

## 2025-06-30 ENCOUNTER — APPOINTMENT (OUTPATIENT)
Dept: ORTHOPEDIC SURGERY | Facility: CLINIC | Age: 70
End: 2025-06-30
Payer: MEDICARE

## 2025-06-30 DIAGNOSIS — M25.551 HIP PAIN, RIGHT: ICD-10-CM

## 2025-06-30 DIAGNOSIS — M17.10 ARTHRITIS OF KNEE: Primary | ICD-10-CM

## 2025-06-30 PROCEDURE — 73502 X-RAY EXAM HIP UNI 2-3 VIEWS: CPT | Mod: RT

## 2025-06-30 PROCEDURE — 73502 X-RAY EXAM HIP UNI 2-3 VIEWS: CPT | Mod: RIGHT SIDE | Performed by: STUDENT IN AN ORGANIZED HEALTH CARE EDUCATION/TRAINING PROGRAM

## 2025-06-30 PROCEDURE — 20610 DRAIN/INJ JOINT/BURSA W/O US: CPT | Performed by: ORTHOPAEDIC SURGERY

## 2025-06-30 PROCEDURE — 99203 OFFICE O/P NEW LOW 30 MIN: CPT | Performed by: ORTHOPAEDIC SURGERY

## 2025-06-30 RX ORDER — LIDOCAINE HYDROCHLORIDE 10 MG/ML
2 INJECTION, SOLUTION INFILTRATION; PERINEURAL
Status: COMPLETED | OUTPATIENT
Start: 2025-06-30 | End: 2025-06-30

## 2025-06-30 RX ORDER — TRIAMCINOLONE ACETONIDE 40 MG/ML
40 INJECTION, SUSPENSION INTRA-ARTICULAR; INTRAMUSCULAR
Status: COMPLETED | OUTPATIENT
Start: 2025-06-30 | End: 2025-06-30

## 2025-06-30 RX ADMIN — LIDOCAINE HYDROCHLORIDE 2 ML: 10 INJECTION, SOLUTION INFILTRATION; PERINEURAL at 11:38

## 2025-06-30 RX ADMIN — TRIAMCINOLONE ACETONIDE 40 MG: 40 INJECTION, SUSPENSION INTRA-ARTICULAR; INTRAMUSCULAR at 11:38

## 2025-06-30 NOTE — PROGRESS NOTES
History of Present Illness:  Patient returns today for an injection with corticosteroid. The patient endorsing knee pain refractory to activity modifications and oral medications.  He states the last injection was helpful.  He is also endorsing some right groin pain.      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 See procedure note.    Injection performed as detailed in the procedure note below.       L Inj/Asp: L knee on 6/30/2025 11:38 AM  Indications: pain  Details: 22 G needle, anteromedial approach  Medications: 2 mL lidocaine 10 mg/mL (1 %); 40 mg triamcinolone acetonide 40 mg/mL  Outcome: tolerated well, no immediate complications  Procedure, treatment alternatives, risks and benefits explained, specific risks discussed. Consent was given by the patient. Immediately prior to procedure a time out was called to verify the correct patient, procedure, equipment, support staff and site/side marked as required. Patient was prepped and draped in the usual sterile fashion.         Right hip:  Reviewed there is some mild arthritis and cam deformity may be degenerative labral tear.  We introduced him to Dr. Bunn and he may follow-up for an ultrasound-guided injection at some point if the symptoms worsen.

## 2025-08-18 ENCOUNTER — PATIENT MESSAGE (OUTPATIENT)
Dept: CARDIOLOGY | Facility: CLINIC | Age: 70
End: 2025-08-18
Payer: MEDICARE

## 2025-08-18 ENCOUNTER — ANCILLARY PROCEDURE (OUTPATIENT)
Dept: CARDIOLOGY | Facility: CLINIC | Age: 70
End: 2025-08-18
Payer: MEDICARE

## 2025-08-18 DIAGNOSIS — R00.2 PALPITATIONS: Primary | ICD-10-CM

## 2025-08-18 DIAGNOSIS — R00.2 PALPITATIONS: ICD-10-CM

## 2025-08-18 DIAGNOSIS — R06.09 DOE (DYSPNEA ON EXERTION): ICD-10-CM

## 2025-08-18 DIAGNOSIS — I48.0 PAROXYSMAL A-FIB (MULTI): Primary | ICD-10-CM

## 2025-08-18 PROCEDURE — 93005 ELECTROCARDIOGRAM TRACING: CPT

## 2025-08-20 LAB
ATRIAL RATE: 71 BPM
P AXIS: 52 DEGREES
P OFFSET: 193 MS
P ONSET: 129 MS
PR INTERVAL: 176 MS
Q ONSET: 217 MS
QRS COUNT: 11 BEATS
QRS DURATION: 98 MS
QT INTERVAL: 400 MS
QTC CALCULATION(BAZETT): 434 MS
QTC FREDERICIA: 423 MS
R AXIS: -31 DEGREES
T AXIS: 7 DEGREES
T OFFSET: 417 MS
VENTRICULAR RATE: 71 BPM

## 2025-09-02 ENCOUNTER — HOSPITAL ENCOUNTER (OUTPATIENT)
Dept: CARDIOLOGY | Facility: CLINIC | Age: 70
Discharge: HOME | End: 2025-09-02
Payer: MEDICARE

## 2025-09-02 DIAGNOSIS — I48.0 PAROXYSMAL A-FIB (MULTI): ICD-10-CM

## 2025-09-02 DIAGNOSIS — R06.09 DOE (DYSPNEA ON EXERTION): ICD-10-CM

## 2025-09-02 DIAGNOSIS — R00.2 PALPITATIONS: ICD-10-CM

## 2025-09-02 LAB
AORTIC VALVE MEAN GRADIENT: 5 MMHG
AORTIC VALVE PEAK VELOCITY: 1.6 M/S
AV PEAK GRADIENT: 10 MMHG
AVA (PEAK VEL): 2.51 CM2
AVA (VTI): 2.63 CM2
EJECTION FRACTION APICAL 4 CHAMBER: 61.8
EJECTION FRACTION: 63 %
LEFT ATRIUM VOLUME AREA LENGTH INDEX BSA: 30.1 ML/M2
LEFT VENTRICLE INTERNAL DIMENSION DIASTOLE: 4.83 CM (ref 3.5–6)
LEFT VENTRICULAR OUTFLOW TRACT DIAMETER: 2.02 CM
MITRAL VALVE E/A RATIO: 0.77
RIGHT VENTRICLE FREE WALL PEAK S': 0.09 CM/S
RIGHT VENTRICLE PEAK SYSTOLIC PRESSURE: 20 MMHG
TRICUSPID ANNULAR PLANE SYSTOLIC EXCURSION: 2.4 CM

## 2025-09-02 PROCEDURE — 93243 EXT ECG>48HR<7D SCAN A/R: CPT

## 2025-09-02 PROCEDURE — 93306 TTE W/DOPPLER COMPLETE: CPT

## 2025-09-02 PROCEDURE — 93306 TTE W/DOPPLER COMPLETE: CPT | Performed by: INTERNAL MEDICINE

## 2025-09-03 ENCOUNTER — APPOINTMENT (OUTPATIENT)
Dept: CARDIOLOGY | Facility: CLINIC | Age: 70
End: 2025-09-03
Payer: MEDICARE

## 2025-09-23 ENCOUNTER — APPOINTMENT (OUTPATIENT)
Dept: ORTHOPEDIC SURGERY | Facility: CLINIC | Age: 70
End: 2025-09-23
Payer: MEDICARE

## 2025-09-29 ENCOUNTER — APPOINTMENT (OUTPATIENT)
Dept: CARDIOLOGY | Facility: CLINIC | Age: 70
End: 2025-09-29
Payer: MEDICARE

## 2025-11-19 ENCOUNTER — APPOINTMENT (OUTPATIENT)
Dept: OPHTHALMOLOGY | Facility: CLINIC | Age: 70
End: 2025-11-19
Payer: MEDICARE

## 2026-02-19 ENCOUNTER — APPOINTMENT (OUTPATIENT)
Dept: PRIMARY CARE | Facility: CLINIC | Age: 71
End: 2026-02-19
Payer: MEDICARE

## (undated) DEVICE — SUTURE, PROLENE, 3-0, 30 IN, FSL, BLUE

## (undated) DEVICE — SUTURE, MONOCRYL, 4-0, 18 IN, PS2, UNDYED

## (undated) DEVICE — Device

## (undated) DEVICE — HEMOSTAT, ARISTA, ABSORBABLE, 3 GRAMS

## (undated) DEVICE — 10MM WIDE, 20CM SILICONE DRAIN, FLAT HUBLESS, FULL LENGTH PERFORATIONS

## (undated) DEVICE — ADULT REM POLYHESIVE II PATIENT RETURN ELECTRODE W/9 FT (2.7 M) ATTACHED CORD

## (undated) DEVICE — GLOVE, SURGICAL, PROTEXIS PI , 7.0, PF, LF

## (undated) DEVICE — NEEDLE, ELECTRODE, ELECTROSURGICAL, INSULATED

## (undated) DEVICE — BLANKET, LOWER BODY, VHA PLUS, ADULT